# Patient Record
Sex: MALE | Race: WHITE | NOT HISPANIC OR LATINO | Employment: OTHER | URBAN - METROPOLITAN AREA
[De-identification: names, ages, dates, MRNs, and addresses within clinical notes are randomized per-mention and may not be internally consistent; named-entity substitution may affect disease eponyms.]

---

## 2019-07-12 ENCOUNTER — APPOINTMENT (OUTPATIENT)
Dept: GENERAL RADIOLOGY | Facility: HOSPITAL | Age: 69
End: 2019-07-12

## 2019-07-12 ENCOUNTER — HOSPITAL ENCOUNTER (OUTPATIENT)
Facility: HOSPITAL | Age: 69
Setting detail: OBSERVATION
Discharge: HOME | End: 2019-07-14
Attending: EMERGENCY MEDICINE | Admitting: HOSPITALIST

## 2019-07-12 ENCOUNTER — HOSPITAL ENCOUNTER (EMERGENCY)
Facility: HOSPITAL | Age: 69
Discharge: ANOTHER HOSPITAL | End: 2019-07-12
Attending: EMERGENCY MEDICINE | Admitting: EMERGENCY MEDICINE

## 2019-07-12 VITALS
DIASTOLIC BLOOD PRESSURE: 98 MMHG | TEMPERATURE: 98.2 F | BODY MASS INDEX: 28.63 KG/M2 | HEIGHT: 70 IN | WEIGHT: 200 LBS | RESPIRATION RATE: 18 BRPM | HEART RATE: 75 BPM | SYSTOLIC BLOOD PRESSURE: 130 MMHG | OXYGEN SATURATION: 98 %

## 2019-07-12 DIAGNOSIS — T18.108A FOREIGN BODY IN ESOPHAGUS, INITIAL ENCOUNTER: Primary | ICD-10-CM

## 2019-07-12 DIAGNOSIS — R13.10 DYSPHAGIA, UNSPECIFIED TYPE: ICD-10-CM

## 2019-07-12 DIAGNOSIS — T18.128A FOOD IMPACTION OF ESOPHAGUS, INITIAL ENCOUNTER: Primary | ICD-10-CM

## 2019-07-12 LAB — TROPONIN T SERPL-MCNC: <0.01 NG/ML (ref 0–0.03)

## 2019-07-12 PROCEDURE — 85025 COMPLETE CBC W/AUTO DIFF WBC: CPT | Performed by: EMERGENCY MEDICINE

## 2019-07-12 PROCEDURE — 25010000002 GLUCAGON (RDNA) PER 1 MG: Performed by: EMERGENCY MEDICINE

## 2019-07-12 PROCEDURE — 96374 THER/PROPH/DIAG INJ IV PUSH: CPT

## 2019-07-12 PROCEDURE — 99283 EMERGENCY DEPT VISIT LOW MDM: CPT

## 2019-07-12 PROCEDURE — 93010 ELECTROCARDIOGRAM REPORT: CPT | Performed by: INTERNAL MEDICINE

## 2019-07-12 PROCEDURE — 96361 HYDRATE IV INFUSION ADD-ON: CPT

## 2019-07-12 PROCEDURE — G0378 HOSPITAL OBSERVATION PER HR: HCPCS

## 2019-07-12 PROCEDURE — 99284 EMERGENCY DEPT VISIT MOD MDM: CPT

## 2019-07-12 PROCEDURE — 99284 EMERGENCY DEPT VISIT MOD MDM: CPT | Performed by: EMERGENCY MEDICINE

## 2019-07-12 PROCEDURE — 93005 ELECTROCARDIOGRAM TRACING: CPT | Performed by: EMERGENCY MEDICINE

## 2019-07-12 PROCEDURE — 71046 X-RAY EXAM CHEST 2 VIEWS: CPT

## 2019-07-12 PROCEDURE — 84484 ASSAY OF TROPONIN QUANT: CPT | Performed by: EMERGENCY MEDICINE

## 2019-07-12 RX ORDER — OMEPRAZOLE 20 MG/1
20 CAPSULE, DELAYED RELEASE ORAL DAILY
COMMUNITY
End: 2019-07-31

## 2019-07-12 RX ORDER — SODIUM CHLORIDE 0.9 % (FLUSH) 0.9 %
10 SYRINGE (ML) INJECTION AS NEEDED
Status: DISCONTINUED | OUTPATIENT
Start: 2019-07-12 | End: 2019-07-14 | Stop reason: HOSPADM

## 2019-07-12 RX ORDER — SODIUM CHLORIDE 9 MG/ML
125 INJECTION, SOLUTION INTRAVENOUS CONTINUOUS
Status: DISCONTINUED | OUTPATIENT
Start: 2019-07-12 | End: 2019-07-13

## 2019-07-12 RX ORDER — LOSARTAN POTASSIUM 100 MG/1
100 TABLET ORAL DAILY
COMMUNITY

## 2019-07-12 RX ADMIN — SODIUM CHLORIDE 1000 ML: 9 INJECTION, SOLUTION INTRAVENOUS at 21:32

## 2019-07-12 RX ADMIN — SODIUM CHLORIDE 125 ML/HR: 9 INJECTION, SOLUTION INTRAVENOUS at 23:38

## 2019-07-12 RX ADMIN — GLUCAGON HYDROCHLORIDE 1 MG: KIT at 20:53

## 2019-07-13 ENCOUNTER — ANESTHESIA EVENT (OUTPATIENT)
Dept: PERIOP | Facility: HOSPITAL | Age: 69
End: 2019-07-13

## 2019-07-13 ENCOUNTER — ANESTHESIA (OUTPATIENT)
Dept: PERIOP | Facility: HOSPITAL | Age: 69
End: 2019-07-13

## 2019-07-13 ENCOUNTER — APPOINTMENT (OUTPATIENT)
Dept: GENERAL RADIOLOGY | Facility: HOSPITAL | Age: 69
End: 2019-07-13

## 2019-07-13 PROBLEM — Z98.890 POSTOPERATIVE HYPOXIA: Status: ACTIVE | Noted: 2019-07-13

## 2019-07-13 PROBLEM — R09.02 POSTOPERATIVE HYPOXIA: Status: ACTIVE | Noted: 2019-07-13

## 2019-07-13 PROBLEM — T17.908A ASPIRATION INTO AIRWAY: Status: ACTIVE | Noted: 2019-07-13

## 2019-07-13 PROBLEM — T18.108A FOREIGN BODY IN ESOPHAGUS: Status: ACTIVE | Noted: 2019-07-13

## 2019-07-13 PROBLEM — R13.10 DYSPHAGIA: Status: ACTIVE | Noted: 2019-07-13

## 2019-07-13 LAB
ALBUMIN SERPL-MCNC: 4.3 G/DL (ref 3.5–5.2)
ALBUMIN/GLOB SERPL: 1.4 G/DL
ALP SERPL-CCNC: 62 U/L (ref 39–117)
ALT SERPL W P-5'-P-CCNC: 31 U/L (ref 1–41)
ANION GAP SERPL CALCULATED.3IONS-SCNC: 10.5 MMOL/L (ref 5–15)
AST SERPL-CCNC: 32 U/L (ref 1–40)
BASOPHILS # BLD AUTO: 0.01 10*3/MM3 (ref 0–0.2)
BASOPHILS # BLD AUTO: 0.04 10*3/MM3 (ref 0–0.2)
BASOPHILS NFR BLD AUTO: 0.1 % (ref 0–1.5)
BASOPHILS NFR BLD AUTO: 0.3 % (ref 0–1.5)
BILIRUB SERPL-MCNC: 2.7 MG/DL (ref 0.2–1.2)
BUN BLD-MCNC: 15 MG/DL (ref 8–23)
BUN/CREAT SERPL: 14.9 (ref 7–25)
CALCIUM SPEC-SCNC: 8.8 MG/DL (ref 8.6–10.5)
CHLORIDE SERPL-SCNC: 102 MMOL/L (ref 98–107)
CO2 SERPL-SCNC: 24.5 MMOL/L (ref 22–29)
CREAT BLD-MCNC: 1.01 MG/DL (ref 0.76–1.27)
DEPRECATED RDW RBC AUTO: 43.2 FL (ref 37–54)
DEPRECATED RDW RBC AUTO: 43.3 FL (ref 37–54)
EOSINOPHIL # BLD AUTO: 0 10*3/MM3 (ref 0–0.4)
EOSINOPHIL # BLD AUTO: 0.01 10*3/MM3 (ref 0–0.4)
EOSINOPHIL NFR BLD AUTO: 0 % (ref 0.3–6.2)
EOSINOPHIL NFR BLD AUTO: 0.1 % (ref 0.3–6.2)
ERYTHROCYTE [DISTWIDTH] IN BLOOD BY AUTOMATED COUNT: 12.6 % (ref 12.3–15.4)
ERYTHROCYTE [DISTWIDTH] IN BLOOD BY AUTOMATED COUNT: 12.6 % (ref 12.3–15.4)
GFR SERPL CREATININE-BSD FRML MDRD: 74 ML/MIN/1.73
GLOBULIN UR ELPH-MCNC: 3 GM/DL
GLUCOSE BLD-MCNC: 128 MG/DL (ref 65–99)
HCT VFR BLD AUTO: 51.8 % (ref 37.5–51)
HCT VFR BLD AUTO: 52 % (ref 37.5–51)
HGB BLD-MCNC: 17.1 G/DL (ref 13–17.7)
HGB BLD-MCNC: 17.5 G/DL (ref 13–17.7)
IMM GRANULOCYTES # BLD AUTO: 0.01 10*3/MM3 (ref 0–0.05)
IMM GRANULOCYTES # BLD AUTO: 0.07 10*3/MM3 (ref 0–0.05)
IMM GRANULOCYTES NFR BLD AUTO: 0.1 % (ref 0–0.5)
IMM GRANULOCYTES NFR BLD AUTO: 0.5 % (ref 0–0.5)
LYMPHOCYTES # BLD AUTO: 0.58 10*3/MM3 (ref 0.7–3.1)
LYMPHOCYTES # BLD AUTO: 1.12 10*3/MM3 (ref 0.7–3.1)
LYMPHOCYTES NFR BLD AUTO: 7.2 % (ref 19.6–45.3)
LYMPHOCYTES NFR BLD AUTO: 7.7 % (ref 19.6–45.3)
MCH RBC QN AUTO: 31.2 PG (ref 26.6–33)
MCH RBC QN AUTO: 31.3 PG (ref 26.6–33)
MCHC RBC AUTO-ENTMCNC: 33 G/DL (ref 31.5–35.7)
MCHC RBC AUTO-ENTMCNC: 33.7 G/DL (ref 31.5–35.7)
MCV RBC AUTO: 93 FL (ref 79–97)
MCV RBC AUTO: 94.5 FL (ref 79–97)
MONOCYTES # BLD AUTO: 0.54 10*3/MM3 (ref 0.1–0.9)
MONOCYTES # BLD AUTO: 0.93 10*3/MM3 (ref 0.1–0.9)
MONOCYTES NFR BLD AUTO: 6.4 % (ref 5–12)
MONOCYTES NFR BLD AUTO: 6.7 % (ref 5–12)
NEUTROPHILS # BLD AUTO: 12.42 10*3/MM3 (ref 1.7–7)
NEUTROPHILS # BLD AUTO: 6.95 10*3/MM3 (ref 1.7–7)
NEUTROPHILS NFR BLD AUTO: 85 % (ref 42.7–76)
NEUTROPHILS NFR BLD AUTO: 85.9 % (ref 42.7–76)
NRBC BLD AUTO-RTO: 0 /100 WBC (ref 0–0.2)
NRBC BLD AUTO-RTO: 0 /100 WBC (ref 0–0.2)
PLATELET # BLD AUTO: 188 10*3/MM3 (ref 140–450)
PLATELET # BLD AUTO: 196 10*3/MM3 (ref 140–450)
PMV BLD AUTO: 9.8 FL (ref 6–12)
PMV BLD AUTO: 9.8 FL (ref 6–12)
POTASSIUM BLD-SCNC: 4.4 MMOL/L (ref 3.5–5.2)
PROT SERPL-MCNC: 7.3 G/DL (ref 6–8.5)
RBC # BLD AUTO: 5.48 10*6/MM3 (ref 4.14–5.8)
RBC # BLD AUTO: 5.59 10*6/MM3 (ref 4.14–5.8)
SODIUM BLD-SCNC: 137 MMOL/L (ref 136–145)
TROPONIN T SERPL-MCNC: <0.01 NG/ML (ref 0–0.03)
WBC NRBC COR # BLD: 14.59 10*3/MM3 (ref 3.4–10.8)
WBC NRBC COR # BLD: 8.09 10*3/MM3 (ref 3.4–10.8)

## 2019-07-13 PROCEDURE — G0378 HOSPITAL OBSERVATION PER HR: HCPCS

## 2019-07-13 PROCEDURE — 25010000002 ONDANSETRON PER 1 MG: Performed by: ANESTHESIOLOGY

## 2019-07-13 PROCEDURE — 25010000002 PIPERACILLIN SOD-TAZOBACTAM PER 1 G: Performed by: INTERNAL MEDICINE

## 2019-07-13 PROCEDURE — 99253 IP/OBS CNSLTJ NEW/EST LOW 45: CPT | Performed by: INTERNAL MEDICINE

## 2019-07-13 PROCEDURE — 85025 COMPLETE CBC W/AUTO DIFF WBC: CPT | Performed by: INTERNAL MEDICINE

## 2019-07-13 PROCEDURE — 25010000002 PROPOFOL 10 MG/ML EMULSION: Performed by: ANESTHESIOLOGY

## 2019-07-13 PROCEDURE — 93005 ELECTROCARDIOGRAM TRACING: CPT | Performed by: INTERNAL MEDICINE

## 2019-07-13 PROCEDURE — 71046 X-RAY EXAM CHEST 2 VIEWS: CPT

## 2019-07-13 PROCEDURE — 43239 EGD BIOPSY SINGLE/MULTIPLE: CPT | Performed by: INTERNAL MEDICINE

## 2019-07-13 PROCEDURE — 25010000002 SUCCINYLCHOLINE PER 20 MG: Performed by: ANESTHESIOLOGY

## 2019-07-13 PROCEDURE — 80053 COMPREHEN METABOLIC PANEL: CPT | Performed by: INTERNAL MEDICINE

## 2019-07-13 PROCEDURE — 25010000002 DEXAMETHASONE PER 1 MG: Performed by: ANESTHESIOLOGY

## 2019-07-13 PROCEDURE — 88305 TISSUE EXAM BY PATHOLOGIST: CPT | Performed by: INTERNAL MEDICINE

## 2019-07-13 PROCEDURE — 84484 ASSAY OF TROPONIN QUANT: CPT | Performed by: INTERNAL MEDICINE

## 2019-07-13 PROCEDURE — 43249 ESOPH EGD DILATION <30 MM: CPT | Performed by: INTERNAL MEDICINE

## 2019-07-13 PROCEDURE — 99284 EMERGENCY DEPT VISIT MOD MDM: CPT | Performed by: INTERNAL MEDICINE

## 2019-07-13 PROCEDURE — 99024 POSTOP FOLLOW-UP VISIT: CPT | Performed by: INTERNAL MEDICINE

## 2019-07-13 PROCEDURE — 93010 ELECTROCARDIOGRAM REPORT: CPT | Performed by: INTERNAL MEDICINE

## 2019-07-13 PROCEDURE — C1726 CATH, BAL DIL, NON-VASCULAR: HCPCS | Performed by: INTERNAL MEDICINE

## 2019-07-13 RX ORDER — LOSARTAN POTASSIUM 100 MG/1
100 TABLET ORAL DAILY
Status: DISCONTINUED | OUTPATIENT
Start: 2019-07-13 | End: 2019-07-14 | Stop reason: HOSPADM

## 2019-07-13 RX ORDER — LIDOCAINE HYDROCHLORIDE 10 MG/ML
0.5 INJECTION, SOLUTION EPIDURAL; INFILTRATION; INTRACAUDAL; PERINEURAL ONCE AS NEEDED
Status: DISCONTINUED | OUTPATIENT
Start: 2019-07-13 | End: 2019-07-13 | Stop reason: HOSPADM

## 2019-07-13 RX ORDER — SODIUM CHLORIDE 0.9 % (FLUSH) 0.9 %
3-10 SYRINGE (ML) INJECTION AS NEEDED
Status: DISCONTINUED | OUTPATIENT
Start: 2019-07-13 | End: 2019-07-14 | Stop reason: HOSPADM

## 2019-07-13 RX ORDER — HYDROMORPHONE HYDROCHLORIDE 1 MG/ML
0.5 INJECTION, SOLUTION INTRAMUSCULAR; INTRAVENOUS; SUBCUTANEOUS
Status: DISCONTINUED | OUTPATIENT
Start: 2019-07-13 | End: 2019-07-13 | Stop reason: HOSPADM

## 2019-07-13 RX ORDER — FENTANYL CITRATE 50 UG/ML
50 INJECTION, SOLUTION INTRAMUSCULAR; INTRAVENOUS
Status: DISCONTINUED | OUTPATIENT
Start: 2019-07-13 | End: 2019-07-13 | Stop reason: HOSPADM

## 2019-07-13 RX ORDER — PROPOFOL 10 MG/ML
VIAL (ML) INTRAVENOUS AS NEEDED
Status: DISCONTINUED | OUTPATIENT
Start: 2019-07-13 | End: 2019-07-13 | Stop reason: SURG

## 2019-07-13 RX ORDER — FLUMAZENIL 0.1 MG/ML
0.2 INJECTION INTRAVENOUS AS NEEDED
Status: DISCONTINUED | OUTPATIENT
Start: 2019-07-13 | End: 2019-07-13 | Stop reason: HOSPADM

## 2019-07-13 RX ORDER — ONDANSETRON 2 MG/ML
INJECTION INTRAMUSCULAR; INTRAVENOUS AS NEEDED
Status: DISCONTINUED | OUTPATIENT
Start: 2019-07-13 | End: 2019-07-13 | Stop reason: SURG

## 2019-07-13 RX ORDER — PANTOPRAZOLE SODIUM 40 MG/1
40 TABLET, DELAYED RELEASE ORAL EVERY MORNING
Status: DISCONTINUED | OUTPATIENT
Start: 2019-07-13 | End: 2019-07-14 | Stop reason: HOSPADM

## 2019-07-13 RX ORDER — FAMOTIDINE 10 MG/ML
20 INJECTION, SOLUTION INTRAVENOUS ONCE
Status: COMPLETED | OUTPATIENT
Start: 2019-07-13 | End: 2019-07-13

## 2019-07-13 RX ORDER — LIDOCAINE HYDROCHLORIDE 20 MG/ML
INJECTION, SOLUTION INFILTRATION; PERINEURAL AS NEEDED
Status: DISCONTINUED | OUTPATIENT
Start: 2019-07-13 | End: 2019-07-13 | Stop reason: SURG

## 2019-07-13 RX ORDER — HYDROCODONE BITARTRATE AND ACETAMINOPHEN 7.5; 325 MG/1; MG/1
1 TABLET ORAL ONCE AS NEEDED
Status: DISCONTINUED | OUTPATIENT
Start: 2019-07-13 | End: 2019-07-13 | Stop reason: HOSPADM

## 2019-07-13 RX ORDER — ONDANSETRON 2 MG/ML
4 INJECTION INTRAMUSCULAR; INTRAVENOUS ONCE AS NEEDED
Status: DISCONTINUED | OUTPATIENT
Start: 2019-07-13 | End: 2019-07-13 | Stop reason: HOSPADM

## 2019-07-13 RX ORDER — LABETALOL HYDROCHLORIDE 5 MG/ML
5 INJECTION, SOLUTION INTRAVENOUS
Status: DISCONTINUED | OUTPATIENT
Start: 2019-07-13 | End: 2019-07-13 | Stop reason: HOSPADM

## 2019-07-13 RX ORDER — HYDRALAZINE HYDROCHLORIDE 20 MG/ML
5 INJECTION INTRAMUSCULAR; INTRAVENOUS
Status: DISCONTINUED | OUTPATIENT
Start: 2019-07-13 | End: 2019-07-13 | Stop reason: HOSPADM

## 2019-07-13 RX ORDER — FAMOTIDINE 10 MG/ML
INJECTION, SOLUTION INTRAVENOUS
Status: COMPLETED
Start: 2019-07-13 | End: 2019-07-13

## 2019-07-13 RX ORDER — ACETAMINOPHEN 325 MG/1
650 TABLET ORAL ONCE AS NEEDED
Status: DISCONTINUED | OUTPATIENT
Start: 2019-07-13 | End: 2019-07-13 | Stop reason: HOSPADM

## 2019-07-13 RX ORDER — NALOXONE HCL 0.4 MG/ML
0.2 VIAL (ML) INJECTION AS NEEDED
Status: DISCONTINUED | OUTPATIENT
Start: 2019-07-13 | End: 2019-07-13 | Stop reason: HOSPADM

## 2019-07-13 RX ORDER — DIPHENHYDRAMINE HCL 25 MG
25 CAPSULE ORAL
Status: DISCONTINUED | OUTPATIENT
Start: 2019-07-13 | End: 2019-07-13 | Stop reason: HOSPADM

## 2019-07-13 RX ORDER — DIPHENHYDRAMINE HYDROCHLORIDE 50 MG/ML
12.5 INJECTION INTRAMUSCULAR; INTRAVENOUS
Status: DISCONTINUED | OUTPATIENT
Start: 2019-07-13 | End: 2019-07-13 | Stop reason: HOSPADM

## 2019-07-13 RX ORDER — SODIUM CHLORIDE 0.9 % (FLUSH) 0.9 %
3 SYRINGE (ML) INJECTION EVERY 12 HOURS SCHEDULED
Status: DISCONTINUED | OUTPATIENT
Start: 2019-07-13 | End: 2019-07-14 | Stop reason: HOSPADM

## 2019-07-13 RX ORDER — NITROGLYCERIN 0.4 MG/1
0.4 TABLET SUBLINGUAL
Status: DISCONTINUED | OUTPATIENT
Start: 2019-07-13 | End: 2019-07-14 | Stop reason: HOSPADM

## 2019-07-13 RX ORDER — SODIUM CHLORIDE 0.9 % (FLUSH) 0.9 %
1-10 SYRINGE (ML) INJECTION AS NEEDED
Status: DISCONTINUED | OUTPATIENT
Start: 2019-07-13 | End: 2019-07-13 | Stop reason: HOSPADM

## 2019-07-13 RX ORDER — DEXAMETHASONE SODIUM PHOSPHATE 4 MG/ML
INJECTION, SOLUTION INTRA-ARTICULAR; INTRALESIONAL; INTRAMUSCULAR; INTRAVENOUS; SOFT TISSUE AS NEEDED
Status: DISCONTINUED | OUTPATIENT
Start: 2019-07-13 | End: 2019-07-13 | Stop reason: SURG

## 2019-07-13 RX ORDER — SUCCINYLCHOLINE CHLORIDE 20 MG/ML
INJECTION INTRAMUSCULAR; INTRAVENOUS AS NEEDED
Status: DISCONTINUED | OUTPATIENT
Start: 2019-07-13 | End: 2019-07-13 | Stop reason: SURG

## 2019-07-13 RX ORDER — EPHEDRINE SULFATE 50 MG/ML
5 INJECTION, SOLUTION INTRAVENOUS ONCE AS NEEDED
Status: DISCONTINUED | OUTPATIENT
Start: 2019-07-13 | End: 2019-07-13 | Stop reason: HOSPADM

## 2019-07-13 RX ORDER — PROMETHAZINE HYDROCHLORIDE 25 MG/ML
12.5 INJECTION, SOLUTION INTRAMUSCULAR; INTRAVENOUS ONCE AS NEEDED
Status: DISCONTINUED | OUTPATIENT
Start: 2019-07-13 | End: 2019-07-13 | Stop reason: HOSPADM

## 2019-07-13 RX ORDER — OXYCODONE AND ACETAMINOPHEN 7.5; 325 MG/1; MG/1
1 TABLET ORAL ONCE AS NEEDED
Status: DISCONTINUED | OUTPATIENT
Start: 2019-07-13 | End: 2019-07-13 | Stop reason: HOSPADM

## 2019-07-13 RX ORDER — ACETAMINOPHEN 325 MG/1
650 TABLET ORAL EVERY 4 HOURS PRN
Status: DISCONTINUED | OUTPATIENT
Start: 2019-07-13 | End: 2019-07-14 | Stop reason: HOSPADM

## 2019-07-13 RX ORDER — PROMETHAZINE HYDROCHLORIDE 25 MG/1
25 TABLET ORAL ONCE AS NEEDED
Status: DISCONTINUED | OUTPATIENT
Start: 2019-07-13 | End: 2019-07-13 | Stop reason: HOSPADM

## 2019-07-13 RX ORDER — SODIUM CHLORIDE, SODIUM LACTATE, POTASSIUM CHLORIDE, CALCIUM CHLORIDE 600; 310; 30; 20 MG/100ML; MG/100ML; MG/100ML; MG/100ML
9 INJECTION, SOLUTION INTRAVENOUS CONTINUOUS
Status: DISCONTINUED | OUTPATIENT
Start: 2019-07-13 | End: 2019-07-13

## 2019-07-13 RX ORDER — PROMETHAZINE HYDROCHLORIDE 25 MG/1
25 SUPPOSITORY RECTAL ONCE AS NEEDED
Status: DISCONTINUED | OUTPATIENT
Start: 2019-07-13 | End: 2019-07-13 | Stop reason: HOSPADM

## 2019-07-13 RX ORDER — PROMETHAZINE HYDROCHLORIDE 25 MG/ML
6.25 INJECTION, SOLUTION INTRAMUSCULAR; INTRAVENOUS
Status: DISCONTINUED | OUTPATIENT
Start: 2019-07-13 | End: 2019-07-13 | Stop reason: HOSPADM

## 2019-07-13 RX ADMIN — SUCCINYLCHOLINE CHLORIDE 160 MG: 20 INJECTION, SOLUTION INTRAMUSCULAR; INTRAVENOUS; PARENTERAL at 01:45

## 2019-07-13 RX ADMIN — TAZOBACTAM SODIUM AND PIPERACILLIN SODIUM 3.38 G: 375; 3 INJECTION, SOLUTION INTRAVENOUS at 06:29

## 2019-07-13 RX ADMIN — PROPOFOL 50 MG: 10 INJECTION, EMULSION INTRAVENOUS at 01:57

## 2019-07-13 RX ADMIN — LOSARTAN POTASSIUM 100 MG: 100 TABLET, FILM COATED ORAL at 09:27

## 2019-07-13 RX ADMIN — LIDOCAINE HYDROCHLORIDE 50 MG: 20 INJECTION, SOLUTION INFILTRATION; PERINEURAL at 01:45

## 2019-07-13 RX ADMIN — SODIUM CHLORIDE 125 ML/HR: 9 INJECTION, SOLUTION INTRAVENOUS at 06:30

## 2019-07-13 RX ADMIN — TAZOBACTAM SODIUM AND PIPERACILLIN SODIUM 3.38 G: 375; 3 INJECTION, SOLUTION INTRAVENOUS at 13:03

## 2019-07-13 RX ADMIN — DEXAMETHASONE SODIUM PHOSPHATE 4 MG: 4 INJECTION INTRA-ARTICULAR; INTRALESIONAL; INTRAMUSCULAR; INTRAVENOUS; SOFT TISSUE at 02:07

## 2019-07-13 RX ADMIN — FAMOTIDINE 20 MG: 10 INJECTION, SOLUTION INTRAVENOUS at 01:26

## 2019-07-13 RX ADMIN — PROPOFOL 200 MG: 10 INJECTION, EMULSION INTRAVENOUS at 01:39

## 2019-07-13 RX ADMIN — PROPOFOL 50 MG: 10 INJECTION, EMULSION INTRAVENOUS at 01:45

## 2019-07-13 RX ADMIN — Medication 3 ML: at 20:03

## 2019-07-13 RX ADMIN — PANTOPRAZOLE SODIUM 40 MG: 40 TABLET, DELAYED RELEASE ORAL at 09:27

## 2019-07-13 RX ADMIN — FAMOTIDINE 20 MG: 10 INJECTION INTRAVENOUS at 01:26

## 2019-07-13 RX ADMIN — ONDANSETRON 4 MG: 2 INJECTION INTRAMUSCULAR; INTRAVENOUS at 02:07

## 2019-07-13 NOTE — PROGRESS NOTES
[facility]  Inpatient Progress Note    Reason for Follow Up:  Dysphagia with an esophageal foreign body.    Subjective     Interval History:   No c/o. No SOA or coughing.     Current Facility-Administered Medications:   •  acetaminophen (TYLENOL) tablet 650 mg, 650 mg, Oral, Q4H PRN, Yariel Steven MD  •  lactated ringers infusion, 9 mL/hr, Intravenous, Continuous, Stephanie Weber MD  •  losartan (COZAAR) tablet 100 mg, 100 mg, Oral, Daily, Yariel Steven MD  •  nitroglycerin (NITROSTAT) SL tablet 0.4 mg, 0.4 mg, Sublingual, Q5 Min PRN, Yariel Steven MD  •  pantoprazole (PROTONIX) EC tablet 40 mg, 40 mg, Oral, QAM, Yariel Steven MD  •  Pharmacy to Dose Zosyn, , Does not apply, Continuous PRN, Glenn Carias MD  •  [COMPLETED] Insert peripheral IV, , , Once **AND** sodium chloride 0.9 % flush 10 mL, 10 mL, Intravenous, PRN, Lakhwinder Wright MD  •  sodium chloride 0.9 % flush 3 mL, 3 mL, Intravenous, Q12H, Yariel Steven MD  •  sodium chloride 0.9 % flush 3-10 mL, 3-10 mL, Intravenous, PRN, Yariel Steven MD  •  sodium chloride 0.9 % infusion, 125 mL/hr, Intravenous, Continuous, Lakhwinder Wright MD, Last Rate: 125 mL/hr at 07/13/19 0630, 125 mL/hr at 07/13/19 0630  Review of Systems:    The following systems were reviewed and negative;  cardiovascular, musculoskeletal and neurological    Objective     Vital Signs  Temp:  [98.2 °F (36.8 °C)-98.6 °F (37 °C)] 98.6 °F (37 °C)  Heart Rate:  [] 111  Resp:  [16-20] 18  BP: ()/(53-98) 110/74  Body mass index is 28.47 kg/m².    Intake/Output Summary (Last 24 hours) at 7/13/2019 0813  Last data filed at 7/13/2019 0630  Gross per 24 hour   Intake 1050 ml   Output 400 ml   Net 650 ml     No intake/output data recorded.     Physical Exam:   General: patient awake, alert and cooperative   Eyes: Normal lids and lashes, no scleral icterus   Neck: supple, normal ROM   Skin: warm and dry, not jaundiced   Cardiovascular: regular rhythm and rate, no murmurs  auscultated   Pulm: clear to auscultation bilaterally, regular and unlabored   Abdomen: soft, nontender, nondistended; normal bowel sounds   Rectal: deferred   Extremities: no rash or edema   Psychiatric: Normal mood and behavior; memory intact     Results Review:     I reviewed the patient's new clinical results.    Results from last 7 days   Lab Units 07/13/19  0644 07/12/19  2330   WBC 10*3/mm3 8.09 14.59*   HEMOGLOBIN g/dL 17.5 17.1   HEMATOCRIT % 52.0* 51.8*   PLATELETS 10*3/mm3 188 196     Results from last 7 days   Lab Units 07/13/19  0644   SODIUM mmol/L 137   POTASSIUM mmol/L 4.4   CHLORIDE mmol/L 102   CO2 mmol/L 24.5   BUN mg/dL 15   CREATININE mg/dL 1.01   CALCIUM mg/dL 8.8   BILIRUBIN mg/dL 2.7*   ALK PHOS U/L 62   ALT (SGPT) U/L 31   AST (SGOT) U/L 32   GLUCOSE mg/dL 128*         No results found for: LIPASE    Radiology:  No orders to display       Assessment/Plan     Patient Active Problem List   Diagnosis   • Foreign body in esophagus   • Dysphagia   • Hypertension   • Postoperative hypoxia   • Aspiration into airway       I discussed the patients findings and my recommendations with patient.    Assessment/Recommendations:  1) Esophageal foreign body with EGD last night and extraction of foreign body with dilation of distal esophageal stricture. On Protonix 40 mg/day.  2) Decreased O2 sat after patient was extubated after procedure. Did he aspirate? I will recheck CXR. On Zosyn. Advance diet.    Glenn Carias MD

## 2019-07-13 NOTE — ANESTHESIA PREPROCEDURE EVALUATION
Anesthesia Evaluation     Patient summary reviewed and Nursing notes reviewed                Airway   Mallampati: II  Dental - normal exam     Pulmonary - negative pulmonary ROS and normal exam   Cardiovascular - normal exam    (+) hypertension,       Neuro/Psych- negative ROS  GI/Hepatic/Renal/Endo    (+)  GERD,      Musculoskeletal (-) negative ROS    Abdominal    Substance History - negative use     OB/GYN          Other - negative ROS                     Anesthesia Plan    ASA 2 - emergent     general

## 2019-07-13 NOTE — ANESTHESIA PROCEDURE NOTES
Airway  Urgency: elective    Airway not difficult    General Information and Staff    Patient location during procedure: OR  Anesthesiologist: Stephanie Weber MD    Indications and Patient Condition  Indications for airway management: airway protection    Preoxygenated: yes  MILS maintained throughout  Mask difficulty assessment: 1 - vent by mask    Final Airway Details  Final airway type: endotracheal airway      Successful airway: ETT  Cuffed: yes   Successful intubation technique: direct laryngoscopy  Facilitating devices/methods: intubating stylet, anterior pressure/BURP and Bougie  Endotracheal tube insertion site: oral  Blade: Franklin  Blade size: 4  ETT size (mm): 7.5  Cormack-Lehane Classification: grade III - view of epiglottis only  Placement verified by: chest auscultation and capnometry   Measured from: lips  Number of attempts at approach: 3 or more

## 2019-07-13 NOTE — H&P
Internal medicine history and physical  INTERNAL MEDICINE   [facility]       Patient Identification:  Name: Fabio Zhang  Age: 68 y.o.  Sex: male  :  1950  MRN: [id number]                   Primary Care Physician: Provider, No Known                                   Chief Complaint: Requested by anesthesiology service after disimpaction of foreign body that patient may require overnight admission to monitor for increased oxygen requirement following intervention.    History of Present Illness:   Patient is a 68-year-old male with history of gastroesophageal reflux disease, history of dysphagia and Schatzki's ring for which he had undergone EGD and dilatation 6 or 7 years ago, history of high blood pressure was otherwise in his usual state of his health until 6 or 6:30 this evening while eating dinner had a steak stuck in his esophagus and he could not swallow afterwards.  After struggling for 30 minutes he was taken to the emergency room at [facility] where initial work-up revealed impacted foreign body/steak in the esophagus requiring surgical or endoscopic intervention to relieve this obstruction.  As a part of the work-up he also had a EKG that revealed left ventricular hypertrophy and right bundle branch block pattern.  Patient did not have any other symptoms of preceding chest pain dyspnea on exertion prior to the impaction of steak.  Patient was accepted in transfer by GI service for emergent endoscopic disimpaction of the foreign body.  It was performed under general anesthesia with intubation.  Post procedure patient required increasing oxygen support resulting in decision making by anesthesiology service for overnight admission and observation.  Hospitalist service was consulted.  Patient is currently feeling better denies any shortness of breath of feel hampered.  Patient's wife was asking about her to follow-up on abnormal EKG and would feel comfortable if he has  cardiology to look at his EKG.  Patient denies any fever chills or sense of ill health at this time.      Past Medical History:  Past Medical History:   Diagnosis Date   • GERD (gastroesophageal reflux disease)    • Hypertension      Past Surgical History:  Past Surgical History:   Procedure Laterality Date   • ESOPHAGEAL DILATATION     • HERNIA REPAIR        Home Meds:  Medications Prior to Admission   Medication Sig Dispense Refill Last Dose   • losartan (COZAAR) 100 MG tablet Take 100 mg by mouth Daily.   7/12/2019 at Unknown time   • omeprazole (priLOSEC) 20 MG capsule Take 20 mg by mouth Daily.   7/12/2019 at Unknown time     Current Meds:     Current Facility-Administered Medications:   •  acetaminophen (TYLENOL) tablet 650 mg, 650 mg, Oral, Once PRN **OR** acetaminophen (TYLENOL) suppository 650 mg, 650 mg, Rectal, Once PRN, Stephanie Weber MD  •  diphenhydrAMINE (BENADRYL) capsule 25 mg, 25 mg, Oral, Q30 Min PRN, Stephanie Weber MD  •  diphenhydrAMINE (BENADRYL) injection 12.5 mg, 12.5 mg, Intravenous, Q15 Min PRN, Stephanie Weber MD  •  ePHEDrine injection 5 mg, 5 mg, Intravenous, Once PRN, Stephanie Weber MD  •  fentaNYL citrate (PF) (SUBLIMAZE) injection 50 mcg, 50 mcg, Intravenous, Q5 Min PRN, Stephanie Weber MD  •  flumazenil (ROMAZICON) injection 0.2 mg, 0.2 mg, Intravenous, PRN, Stephanie Weber MD  •  hydrALAZINE (APRESOLINE) injection 5 mg, 5 mg, Intravenous, Q10 Min PRN, Stephanie Weber MD  •  HYDROcodone-acetaminophen (NORCO) 7.5-325 MG per tablet 1 tablet, 1 tablet, Oral, Once PRN, Stephanie Weber MD  •  HYDROmorphone (DILAUDID) injection 0.5 mg, 0.5 mg, Intravenous, Q5 Min PRNTia Cheryl G, MD  •  labetalol (NORMODYNE,TRANDATE) injection 5 mg, 5 mg, Intravenous, Q5 Min PRN, Stephanie Weber MD  •  lactated ringers infusion, 9 mL/hr, Intravenous, Continuous, Stephanie Weber MD  •  naloxone (NARCAN) injection 0.2 mg, 0.2 mg, Intravenous, PRN, Stephanie Weber MD  •   ondansetron (ZOFRAN) injection 4 mg, 4 mg, Intravenous, Once PRN, Stephanie Weber MD  •  oxyCODONE-acetaminophen (PERCOCET) 7.5-325 MG per tablet 1 tablet, 1 tablet, Oral, Once PRN, Stephanie Weber MD  •  piperacillin-tazobactam (ZOSYN) 3.375 g in iso-osmotic dextrose 50 ml (premix), 3.375 g, Intravenous, BID, Glenn Carias MD  •  promethazine (PHENERGAN) injection 6.25 mg, 6.25 mg, Intravenous, Q10 Min PRN **OR** promethazine (PHENERGAN) injection 12.5 mg, 12.5 mg, Intramuscular, Once PRN **OR** promethazine (PHENERGAN) suppository 25 mg, 25 mg, Rectal, Once PRN **OR** promethazine (PHENERGAN) tablet 25 mg, 25 mg, Oral, Once PRN, Stephanie Weber MD  •  [COMPLETED] Insert peripheral IV, , , Once **AND** [MAR Hold] sodium chloride 0.9 % flush 10 mL, 10 mL, Intravenous, PRN, Lakhwinder Wright MD  •  sodium chloride 0.9 % infusion, 125 mL/hr, Intravenous, Continuous, Lakhwinder Wright MD, Last Rate: 125 mL/hr at 07/12/19 2338, 125 mL/hr at 07/12/19 2338  Allergies:  No Known Allergies  Social History:   Social History     Tobacco Use   • Smoking status: Never Smoker   Substance Use Topics   • Alcohol use: No     Frequency: Never      Family History:  History reviewed. No pertinent family history.       Review of Systems  See history of present illness and past medical history.    Constitutional: Remarkable for no fever or chills  Cardiovascular: Remarkable for nonspecific chest pain or shortness of breath except for the chest discomfort that he had after the impaction of food.  Currently he is free of any symptoms at this time.  Respiratory: Remarkable for increased oxygen requirement but patient himself described no specific symptoms of shortness of breath.  GI: Remarkable for impacted steak as described in history of presenting illness denies any nausea vomiting or diarrhea.  : Remarkable for no burning in urination frequency urgency  Musculoskeletal: Remarkable for no specific joint aches and pain  Neurological:  "Remarkable for no loss of consciousness or continence.      Vitals:   /81 (BP Location: Right arm, Patient Position: Lying)   Pulse 85   Temp 98.5 °F (36.9 °C) (Oral)   Resp 18   Ht 177.8 cm (70\")   Wt 90 kg (198 lb 6.6 oz)   SpO2 94%   BMI 28.47 kg/m²   I/O: No intake or output data in the 24 hours ending 07/13/19 0348  Exam:  General Appearance:    Alert, cooperative, well-nourished male, no distress, appears stated age, lying in PACU does not appear to be in any acute distress.   Head:    Normocephalic, without obvious abnormality, atraumatic   Eyes:    PERRL, conjunctiva/corneas clear, EOM's intact, both eyes   Ears:    Normal external ear canals, both ears   Nose:   Nares normal, septum midline, mucosa normal, no drainage    or sinus tenderness   Throat:   Lips, tongue, gums normal; oral mucosa pink and moist   Neck:   Supple, symmetrical, trachea midline, no adenopathy;     thyroid:  no enlargement/tenderness/nodules; no carotid    bruit or JVD   Back:     Symmetric, no curvature, ROM normal, no CVA tenderness   Lungs:    Bilateral air entry clear to auscultation, no obvious use of accessory muscles of breathing noted   Chest Wall:    No tenderness or deformity    Heart:    Regular rate and rhythm, S1 and S2 normal, no murmur, rub   or gallop   Abdomen:     Soft, non-tender, bowel sounds active all four quadrants,     no masses, no hepatomegaly, no splenomegaly   Extremities:   Extremities normal, atraumatic, no cyanosis or edema   Pulses:   Pulses palpable in all extremities; symmetric all extremities   Skin:   Skin color normal, Skin is warm and dry,  no rashes or palpable lesions   Neurologic:   CNII-XII intact, motor strength grossly intact, sensation grossly intact to light touch, no focal deficits noted       Data Review:      I reviewed the patient's new clinical results.  Results from last 7 days   Lab Units 07/12/19  2330   WBC 10*3/mm3 14.59*   HEMOGLOBIN g/dL 17.1   PLATELETS 10*3/mm3 " 196         Xr Chest 2 View-personally reviewed    Result Date: 7/12/2019  Impression:  1.  No acute abnormality. No pneumomediastinum to indicate esophageal perforation. Signer Name: Danilo Swift MD  Signed: 7/12/2019 10:21 PM  Workstation Name: [id number]     No orders to display       Assessment:  Active Hospital Problems    Diagnosis POA   • **Postoperative hypoxia [R09.02, Z98.890] Unknown   • Foreign body in esophagus [T18.108A] Unknown     Added automatically from request for surgery [id number]     • Dysphagia [R13.10] Unknown     Added automatically from request for surgery [id number]     • Aspiration into airway [T17.908A] Unknown       Medical decision making:  Postoperative hypoxia requiring increased oxygen requirement in the setting of impacted esophageal foreign body and endoscopic disimpaction-plan is to admit the patient monitor his oxygen saturation.  Clinically does not appear to have evidence of aspiration pneumonia and may be at risk of what we can describe his chemical pneumonitis/mental since phenomena.  Plan is to admit the patient monitor his oxygen saturation provide him on as needed oxygen and see how he does.  We will hold onto any specific antibiotic therapy unless his condition changes.  Overall care plan discussed with the patient and his wife and the nursing staff.  Dysphagia secondary to mechanical impaction of foreign body/steak-patient is status post endoscopic removal of foreign body plan is to monitor and continue with GI follow-up.  History of hypertension-continue his antihypertensive regimen.  Abnormal EKG with no associated symptoms plan repeat EKG and cardiology evaluation.    Yariel Steven MD   7/13/2019  3:48 AM  Much of this encounter note is an electronic transcription/translation of spoken language to printed text. The electronic translation of spoken language may permit erroneous, or at times, nonsensical words or phrases to be inadvertently transcribed; Although I have  "reviewed the note for such errors, some may still exist  Important note to protect myself: We are asked to admit the patient after anesthesia recommended the need for overnight observation given the increased oxygen requirement and concern for possible aspiration.  When we requested the nurse to have anesthesia write initial disposition orders because they have evaluated the patient and have looked at the patient in person I was mis-quoted by the nurse to Dr. Carias that \"I have given her hell\".  It is unfortunate that patient should receive initial disposition order from a physician who has not evaluated or taking care of the patient and request to do it properly is seen as personality conflict.  After this conversation I had no choice but to drop everything to protect my reputation and come and see the patient immediately.    Based on my training and practice of medicine for the last 20 years the ideal care pathway would have been a call from a physician/aneasthesiologist to hospitalist service explaining the change in status requiring this admission and initial disposition decision be made by the caring physician who is looking at the patient.  Requesting a proper care program for a patient should not be seen as difficult to deal with or lament the requesting person as somebody having personality disorder.  This creates a very difficult working environment.  "

## 2019-07-13 NOTE — NURSING NOTE
Dr. mtz returned call for consult to admit patient.  Asked to return call to dr mccarthy for initial orders and then [facility] would follow patient in hospital

## 2019-07-13 NOTE — PLAN OF CARE
Problem: Patient Care Overview  Goal: Plan of Care Review  Outcome: Ongoing (interventions implemented as appropriate)   07/13/19 5363   Coping/Psychosocial   Plan of Care Reviewed With patient   Plan of Care Review   Progress improving   OTHER   Outcome Summary VSS. Telemetry monitor, SR with BBB. Up ad kayleen. Room air. Tolerating GI soft diet. Will continue to monitor.

## 2019-07-13 NOTE — ED NOTES
"Pt is sitting outside with red solo cup. States that he \"feels it\" in his esophagus.  Denies soa or any difficulty breathing.     Chloe Franklin RN  07/12/19 2006       hCloe Franklin RN  07/12/19 2006    "

## 2019-07-13 NOTE — CONSULTS
[facility]  Initial Inpatient Consult Note    Referring Provider: Dr. 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 He cannot swallow his spit. He has had similar problems of dysphagia in the past requiring Dr. SIMONA Bains to do an EGD and esophageal dialtion about 7 yrs ago. He has had a 9-10 yr h/o intermittent solids dysphagia. He has never had to go to the ER in the past. He does have some heartburn and takes Prilosec OTC prn. No diarrhea or constipation.  He has no rectal bleeding or melena.  His last colonoscopy was about 5 years ago when a polyp was found.  He was told that he should have a repeat colonoscopy in about 5 years.  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    Past Medical History:  Past Medical History:   Diagnosis Date   • GERD (gastroesophageal reflux disease)    • Hypertension      Past Surgical History:  Past Surgical History:   Procedure Laterality Date   • ESOPHAGEAL DILATATION     • HERNIA REPAIR        Social History:   Social History     Tobacco Use   • Smoking status: Never Smoker   Substance Use Topics   • Alcohol use: No     Frequency: Never      Family History:  History reviewed. No pertinent family history.    Home Meds:    (Not in a hospital admission)  Current Meds:      Allergies:  No Known Allergies  Review of Systems  The following systems were reviewed and negative;  respiratory, cardiovascular, musculoskeletal and neurological     Objective     Vital Signs  Temp:  [98.2 °F (36.8 °C)-98.5 °F (36.9 °C)] 98.5 °F (36.9 °C)  Heart Rate:  [64-84] 84  Resp:  [16-18] 18  BP: (128-138)/(71-98) 128/72  Physical Exam:  General  Appearance:    Alert, cooperative, in no acute distress   Head:    Normocephalic, without obvious abnormality, atraumatic   Eyes:            Lids and lashes normal, conjunctivae and sclerae normal, no   icterus   Throat:   No oral lesions, no thrush, oral mucosa moist   Neck:   No adenopathy, supple, trachea midline, no thyromegaly, no   carotid bruit, no JVD   Lungs:     Clear to auscultation,respirations regular, even and                   unlabored    Heart:    Regular rhythm and normal rate, normal S1 and S2, no            murmur, no gallop, no rub, no click   Chest Wall:    No abnormalities observed   Abdomen:     Normal bowel sounds, no masses, no organomegaly, soft        non-tender, non-distended, no guarding, no rebound                 tenderness   Rectal:     Deferred   Extremities:   no edema, no cyanosis, no redness   Skin:   No bleeding, bruising or rash   Lymph nodes:   No palpable adenopathy   Psychiatric:  Judgement and insight: normal   Orientation to person place and time: normal   Mood and affect: normal   Results Review:   I reviewed the patient's new clinical results.    Results from last 7 days   Lab Units 07/12/19  2330   WBC 10*3/mm3 14.59*   HEMOGLOBIN g/dL 17.1   HEMATOCRIT % 51.8*   PLATELETS 10*3/mm3 196           Invalid input(s): LABALBU, PROT      No results found for: LIPASE    Radiology:  No orders to display       Assessment/Plan   There is no problem list on file for this patient.      I discussed the patients findings and my recommendations with patient, family and nursing staff.     Assessment:  1.  Long history of intermittent solids dysphasia.  2.  Long history of heartburn requiring him to take Prilosec over-the-counter as needed.  3.  Patient now appears to have an esophageal foreign body that is stuck.  The patient cannot swallow his spit.  4.  Patient has a history of colon polyps.  He is due for another colonoscopy.    Recommendations:  1.  I will plan to do an EGD tonight  to remove the esophageal foreign body.  I will possibly dilate his esophagus?  2.  He should probably have a colonoscopy as an outpatient at some point soon.    Glenn Carias MD

## 2019-07-13 NOTE — PROGRESS NOTES
LOS: 0 days     Name: Fabio Zhang  Age/Sex: 68 y.o. male  :  1950        PCP: Provider, No Known  Chief Complaint   Patient presents with   • food bolus      Subjective   Overall doing better.  Still hurts to take a deep breath.  Still having some pain in his esophagus as well.  General: No Fever or Chills, Cardiac: No Chest Pain or Palpitations, Resp: No Cough or SOA, GI: No Nausea, Vomiting, or Diarrhea and Other: No bleeding      losartan 100 mg Oral Daily   pantoprazole 40 mg Oral QAM   piperacillin-tazobactam 3.375 g Intravenous Q8H   sodium chloride 3 mL Intravenous Q12H       lactated ringers 9 mL/hr    Pharmacy to Dose Zosyn     sodium chloride 125 mL/hr Last Rate: 125 mL/hr (19 0630)       Objective   Vital Signs  Temp:  [98.2 °F (36.8 °C)-98.6 °F (37 °C)] 98.6 °F (37 °C)  Heart Rate:  [] 99  Resp:  [16-20] 18  BP: ()/(53-98) 110/74  Body mass index is 28.47 kg/m².    Intake/Output Summary (Last 24 hours) at 2019 1439  Last data filed at 2019 1226  Gross per 24 hour   Intake 1470 ml   Output 400 ml   Net 1070 ml       Physical Exam   Constitutional: He is oriented to person, place, and time. He appears well-developed and well-nourished.   HENT:   Head: Normocephalic and atraumatic.   Cardiovascular: Normal rate, regular rhythm and normal heart sounds.   Pulmonary/Chest: Effort normal and breath sounds normal.   Abdominal: Soft. Bowel sounds are normal.   Neurological: He is alert and oriented to person, place, and time.   Skin: Skin is warm.   Psychiatric: He has a normal mood and affect. His behavior is normal.   Nursing note and vitals reviewed.        Results Review:       I reviewed the patient's new clinical results.  Results from last 7 days   Lab Units 19  0644 19  2330   WBC 10*3/mm3 8.09 14.59*   HEMOGLOBIN g/dL 17.5 17.1   PLATELETS 10*3/mm3 188 196     Results from last 7 days   Lab Units 19  0644   SODIUM mmol/L 137   POTASSIUM mmol/L  4.4   CHLORIDE mmol/L 102   CO2 mmol/L 24.5   BUN mg/dL 15   CREATININE mg/dL 1.01   CALCIUM mg/dL 8.8   Estimated Creatinine Clearance: 81.2 mL/min (by C-G formula based on SCr of 1.01 mg/dL).      Assessment/Plan     Postoperative hypoxia    Foreign body in esophagus    Dysphagia    Aspiration into airway      PLAN  1.  Aspiration pneumonitis: I do not feel like he is got pneumonia.  I am to stop the Zosyn and DC his IV fluids.  I reviewed his chest x-ray looks more like atelectasis to me.  We will get him an incentive spirometer.  2.  Postoperative respiratory failure: He was hypoxic requiring up to 5 L of oxygen.  He is currently off oxygen and doing much better.  I suspect with continued aggressive pulmonary hygiene he will not require any oxygen following discharge  3.  Right bundle branch block: Plan further outpatient work-up.  Discussed with Dr. Harvey  4.  Foreign body in the esophagus: Responded well to endoscopic removal.  He still having some pain and tenderness there but otherwise is shown good improvement.      Disposition  Plan Home in the morning      Ash Duong MD  [facility]  07/13/19  2:39 PM

## 2019-07-13 NOTE — NURSING NOTE
SCOTTIE paged per Dr. Weber request to admit patient for observation due to low O2 sats post op.  Requiring hi kishan nasal cannula for 93% saturation

## 2019-07-13 NOTE — ED NOTES
Pt states he feels improvement, felt as if bolus has moved, able to drink small sips of water but continues to have saliva that he can't swallow fullly, dr marshall informed     Yazmin Castillo RN  07/12/19 2108

## 2019-07-13 NOTE — ANESTHESIA POSTPROCEDURE EVALUATION
"Patient: Fabio Zhang    Procedure Summary     Date:  07/13/19 Room / Location:  [facility] OR  / [facility] MAIN OR    Anesthesia Start:  0135 Anesthesia Stop:  0238    Procedure:  ESOPHAGOGASTRODUODENOSCOPY for foreign body removal, balloon dilation, biopsies (N/A Esophagus) Diagnosis:       Foreign body in esophagus, initial encounter      Dysphagia, unspecified type      (Foreign body in esophagus, initial encounter [T18.108A])      (Dysphagia, unspecified type [R13.10])    Surgeon:  Glenn Carias MD Provider:  Stephanie Weber MD    Anesthesia Type:  general ASA Status:  2 - Emergent          Anesthesia Type: general  Last vitals  BP   102/66 (07/13/19 0315)   Temp   36.9 °C (98.5 °F) (07/13/19 0230)   Pulse   84 (07/13/19 0315)   Resp   18 (07/13/19 0315)     SpO2   94 % (07/13/19 0315)     Post Anesthesia Care and Evaluation    Patient location during evaluation: PACU  Patient participation: complete - patient participated  Level of consciousness: awake  Pain management: adequate  Airway patency: patent  Anesthetic complications: No anesthetic complications    Cardiovascular status: acceptable  Respiratory status: acceptable and nasal cannula  Hydration status: acceptable    Comments: /66 (BP Location: Right arm, Patient Position: Lying)   Pulse 84   Temp 36.9 °C (98.5 °F) (Oral)   Resp 18   Ht 177.8 cm (70\")   Wt 90 kg (198 lb 6.6 oz)   SpO2 94%   BMI 28.47 kg/m²   Patient being admitted for suspected aspiration.         "

## 2019-07-13 NOTE — CONSULTS
Date of Hospital Visit: 19  Encounter Provider: Evelyn Schmid, RN EXTENDER  Place of Service: [facility] CARDIOLOGY  Patient Name: Fabio Zhang  :1950  [id number]  Referral Provider: No ref. provider found    Chief complaint: abnormal EKG    History of Present Illness:  Mr. Zhang is a 68 year old man with history of hypertension, hypothyroidism, dysphagia/ esophageal dilation, and sleep apnea. He presented to ED at [facility] on 19 with after getting a piece of steak was stuck in his mid-esophagus. The obstruction was not able to be cleared and he was transferred to [facility] for EGD with Dr. Carias to remove foreign body. EGD also showed some benign stenosis and his esophagus was dilated. During evaluation, he was noted to have a right bundle branch block and LVH on his EKG. He denies chest pain and troponin has been <0.01 x 2.       Past Medical History:   Diagnosis Date   • GERD (gastroesophageal reflux disease)    • Hypertension        Past Surgical History:   Procedure Laterality Date   • ESOPHAGEAL DILATATION     • HERNIA REPAIR         Medications Prior to Admission   Medication Sig Dispense Refill Last Dose   • losartan (COZAAR) 100 MG tablet Take 100 mg by mouth Daily.   2019 at Unknown time   • omeprazole (priLOSEC) 20 MG capsule Take 20 mg by mouth Daily.   2019 at Unknown time       Current Meds  Scheduled Meds:  losartan 100 mg Oral Daily   pantoprazole 40 mg Oral QAM   sodium chloride 3 mL Intravenous Q12H     Continuous Infusions:  lactated ringers 9 mL/hr    Pharmacy to Dose Zosyn     sodium chloride 125 mL/hr Last Rate: 125 mL/hr (19 0630)     PRN Meds:.•  acetaminophen  •  nitroglycerin  •  Pharmacy to Dose Zosyn  •  [COMPLETED] Insert peripheral IV **AND** sodium chloride  •  sodium chloride    Allergies as of 2019   • (No Known Allergies)       Social History     Socioeconomic History   • Marital status:      Spouse  "name: Not on file   • Number of children: Not on file   • Years of education: Not on file   • Highest education level: Not on file   Tobacco Use   • Smoking status: Never Smoker   Substance and Sexual Activity   • Alcohol use: No     Frequency: Never   • Drug use: No       History reviewed. No pertinent family history.    REVIEW OF SYSTEMS:   ROS was performed and is negative except as outlined in HPI     REVIEW OF SYSTEMS:   CONSTITUTIONAL: No weight loss, fever, chills, weakness or fatigue.   HEENT: Eyes: No visual loss, blurred vision, double vision or yellow sclerae. Ears, Nose, Throat: No hearing loss, sneezing, congestion, runny nose or sore throat.   SKIN: No rash or itching.     RESPIRATORY: No shortness of breath, hemoptysis, cough or sputum.   GASTROINTESTINAL: No anorexia, nausea, vomiting or diarrhea. No abdominal pain, bright red blood per rectum or melena.  GENITOURINARY: No burning on urination, hematuria or increased frequency.  NEUROLOGICAL: No headache, dizziness, syncope, paralysis, ataxia, numbness or tingling in the extremities. No change in bowel or bladder control.   MUSCULOSKELETAL: No muscle, back pain, joint pain or stiffness.   HEMATOLOGIC: No anemia, bleeding or bruising.   LYMPHATICS: No enlarged nodes. No history of splenectomy.   PSYCHIATRIC: No history of depression, anxiety, hallucinations.   ENDOCRINOLOGIC: No reports of sweating, cold or heat intolerance. No polyuria or polydipsia.        Objective:   Temp:  [98.2 °F (36.8 °C)-98.6 °F (37 °C)] 98.6 °F (37 °C)  Heart Rate:  [] 111  Resp:  [16-20] 18  BP: ()/(53-98) 110/74  Body mass index is 28.47 kg/m².  Flowsheet Rows      First Filed Value   Admission Height  177.8 cm (70\") Documented at 07/12/2019 2243   Admission Weight  90 kg (198 lb 6.6 oz) Documented at 07/12/2019 2243        Vitals:    07/13/19 1022   BP:    Pulse:    Resp:    Temp:    SpO2: 96%       Head:    Normocephalic, without obvious abnormality, " atraumatic   Eyes:            Lids and lashes normal, conjunctivae and sclerae normal, no   icterus, no pallor   Ears:    Ears appear intact with no abnormalities noted   Throat:   No oral lesions, dentition good   Neck:   No adenopathy, supple, trachea midline, no thyromegaly, no   carotid bruit, no JVD   Lungs:     Breath sounds are equal and clear to auscultation    Heart:    Normal S1 and S2, RRR, No M/G/R   Abdomen:     Normal bowel sounds, no masses, no organomegaly, soft        non-tender, non-distended, no guarding   Extremities:   Moves all extremities well, no edema, no cyanosis, no redness   Pulses:   Pulses palpable and equal bilaterally.    Skin:  Psychiatric:   No bleeding, bruising or rash    Awake, alert and oriented x 3, normal mood and affect           Telemetry:          EKG:            I personally viewed and interpreted the patient's EKG/Telemetry data    Assessment:  Active Hospital Problems    Diagnosis  POA   • **Postoperative hypoxia [R09.02, Z98.890]  Unknown   • Foreign body in esophagus [T18.108A]  Unknown   • Dysphagia [R13.10]  Unknown   • Aspiration into airway [T17.908A]  Unknown      Resolved Hospital Problems   No resolved problems to display.       Plan:    68 years old male with a foreign body in esophagus dysphagia, also had a hypoxia, was found to have a EKG abnormal with a right bundle branch block has no chest pains no shortness of breath has minimum cardiac risk factors will need a echocardiogram as well as a stress test, can be done as an outpatient    Aman Harvey MD  07/13/19  10:27 AM.

## 2019-07-13 NOTE — ED PROVIDER NOTES
Subjective   History of Present Illness  History of Present Illness    Chief complaint: Feels like steak is stuck    Location: Esophagus    Quality/Severity: Unable to swallow liquid    Timing/Onset/Duration: Noted at 6:15 PM tonight    Modifying Factors: Patient unable to swallow liquids    Associated Symptoms: No fever or chills.  No shortness of breath.  No chest pain except for midesophagus.    Narrative: This 68-year-old white male with history of esophageal food impaction in the past, was eating steak at 6:15 PM tonight and felt like food got stuck.  The patient has no other complaints.    PCP: No known provider      Review of Systems   Constitutional: Negative for chills and fever.   Respiratory:        Feels like food is stuck in the esophagus   Gastrointestinal: Negative for abdominal pain and nausea.        Spitting up saliva        Medication List      ASK your doctor about these medications    losartan 100 MG tablet  Commonly known as:  COZAAR     omeprazole 20 MG capsule  Commonly known as:  priLOSEC          Past Medical History:   Diagnosis Date   • GERD (gastroesophageal reflux disease)    • Hypertension        No Known Allergies    Past Surgical History:   Procedure Laterality Date   • ESOPHAGEAL DILATATION     • HERNIA REPAIR         History reviewed. No pertinent family history.    Social History     Socioeconomic History   • Marital status:      Spouse name: Not on file   • Number of children: Not on file   • Years of education: Not on file   • Highest education level: Not on file   Tobacco Use   • Smoking status: Never Smoker   Substance and Sexual Activity   • Alcohol use: No     Frequency: Never   • Drug use: No           Objective   Physical Exam   Constitutional: He is oriented to person, place, and time. He appears well-developed and well-nourished. No distress.   ED Triage Vitals (07/12/19 2029)  Temp: 98.2 °F (36.8 °C)  Heart Rate: 64  Resp: 16  BP: 138/71  SpO2: 99 %  Temp src:  Oral  Heart Rate Source: Monitor  Patient Position: Sitting  BP Location: Right arm  FiO2 (%): n/a    The patient's vitals were reviewed by me.  Unless otherwise noted they are within normal limits.     HENT:   Head: Normocephalic and atraumatic.   Nose: Nose normal.   Mouth/Throat: Oropharynx is clear and moist. No oropharyngeal exudate.   Pulmonary/Chest: Effort normal and breath sounds normal.   Abdominal: Soft. Bowel sounds are normal. He exhibits no distension and no mass. There is no tenderness. There is no rebound and no guarding. No hernia.   Musculoskeletal: He exhibits no edema.   Neurological: He is alert and oriented to person, place, and time.   Skin: Skin is warm and dry. No rash noted.   Psychiatric: He has a normal mood and affect.   Nursing note and vitals reviewed.      Procedures           ED Course        9:23 PM, 07/12/19:  The patient was given glucagon, he still cannot tolerate clear liquids.  He is spitting up his saliva.    9:24 PM, 07/12/19:  GI on call at [facility] was paged out.  We have no GI coverage here at [facility].    9:24 PM, 07/12/19:  The patient's diagnosis of esophageal food impaction was discussed with him.  The plan for transfer to [facility] for endoscopy for removal of food impaction was discussed with the patient.  All of his questions were answered he will be transferred in stable condition.  Patient has been advised not to eat or drink anything else.    9:33 PM, 07/12/19:  I spoke with Dr. Carias, on-call for GI at [facility].  He states that we could transfer the patient to the emergency department but he would not accept the patient directly.    9:38 PM, 07/12/19:  I spoke with Dr. Dominguez, the emergency attending at [facility], he will accept the patient.    9:39 PM, 07/12/19:  An EKG was obtained at 2135.  EKG was read by me at 2136.  EKG shows a normal sinus rhythm with rate of 61.   There is a right bundle branch block and left ventricular hypertrophy.  The VT, and QT intervals are unremarkable.  There is no acute ST elevation or depression.        MDM    No orders to display     Labs Reviewed - No data to display  No results found.    Final diagnoses:   Food impaction of esophagus, initial encounter         ED Medications:  Medications   glucagon (GLUCAGEN) injection 1 mg (1 mg Intravenous Given 7/12/19 2053)       New Medications:     Medication List      ASK your doctor about these medications    losartan 100 MG tablet  Commonly known as:  COZAAR     omeprazole 20 MG capsule  Commonly known as:  priLOSEC          Stopped Medications:     Medication List      ASK your doctor about these medications    losartan 100 MG tablet  Commonly known as:  COZAAR     omeprazole 20 MG capsule  Commonly known as:  priLOSEC            Final diagnoses:   Food impaction of esophagus, initial encounter            Breezy Tong MD  07/12/19 2125       Breezy Tong MD  07/12/19 2203

## 2019-07-13 NOTE — ED PROVIDER NOTES
EMERGENCY DEPARTMENT ENCOUNTER    CHIEF COMPLAINT  Chief Complaint: Food bolus  History given by: Patient  History limited by: None  Room Number: 10/10  PMD: Provider, No Known      HPI:  Pt is a 68 y.o. male who presents stating he has a piece of steak stuck in his esophagus that began earlier tonight. He is unable to tolerate his own secretions, but denies any SOA. He was seen at [facility] for the same complaints, but was referred here, as they have no GI coverage. He was given Glucagon at [facility] without improvement. He reports he had a esophogeal dilation several years ago by Dr. Bains.    Duration: Began earlier tonight  Timing: Constant  Intensity/Severity: Moderate  Progression: Unchanged  Associated Symptoms: None stated  Previous Episodes:  He was seen at [facility] for the same complaints, but was referred here, as they have no GI coverage.  Treatment before arrival: He was given Glucagon at [facility] without improvement.    PAST MEDICAL HISTORY  Active Ambulatory Problems     Diagnosis Date Noted   • No Active Ambulatory Problems     Resolved Ambulatory Problems     Diagnosis Date Noted   • No Resolved Ambulatory Problems     Past Medical History:   Diagnosis Date   • GERD (gastroesophageal reflux disease)    • Hypertension        PAST SURGICAL HISTORY  Past Surgical History:   Procedure Laterality Date   • ESOPHAGEAL DILATATION     • HERNIA REPAIR         FAMILY HISTORY  History reviewed. No pertinent family history.    SOCIAL HISTORY  Social History     Socioeconomic History   • Marital status:      Spouse name: Not on file   • Number of children: Not on file   • Years of education: Not on file   • Highest education level: Not on file   Tobacco Use   • Smoking status: Never Smoker   Substance and Sexual Activity   • Alcohol use: No     Frequency: Never   • Drug use: No       ALLERGIES  Patient has no known allergies.    REVIEW OF SYSTEMS  Review of Systems    Constitutional: Negative for activity change, appetite change and fever.   HENT: Negative for congestion and sore throat.         Pt reports a piece of steak stuck in his esophagus. He is unable to tolerate his own secretions.   Eyes: Negative.    Respiratory: Negative for cough and shortness of breath.    Cardiovascular: Negative for chest pain and leg swelling.   Gastrointestinal: Negative for abdominal pain, diarrhea and vomiting.   Endocrine: Negative.    Genitourinary: Negative for decreased urine volume and dysuria.   Musculoskeletal: Negative for neck pain.   Skin: Negative for rash and wound.   Allergic/Immunologic: Negative.    Neurological: Negative for weakness, numbness and headaches.   Hematological: Negative.    Psychiatric/Behavioral: Negative.    All other systems reviewed and are negative.      PHYSICAL EXAM  ED Triage Vitals [07/12/19 2243]   Temp Heart Rate Resp BP SpO2   98.5 °F (36.9 °C) 84 18 128/72 97 %      Temp src Heart Rate Source Patient Position BP Location FiO2 (%)   Tympanic -- -- -- --       Physical Exam   Constitutional: He is oriented to person, place, and time and well-developed, well-nourished, and in no distress. No distress.   HENT:   Head: Normocephalic and atraumatic.   Eyes: EOM are normal. Pupils are equal, round, and reactive to light.   Neck: Normal range of motion. Neck supple.   Cardiovascular: Normal rate, regular rhythm and normal heart sounds.   Pulmonary/Chest: Effort normal and breath sounds normal. No respiratory distress.   Abdominal: Soft. There is no tenderness. There is no rebound and no guarding.   Musculoskeletal: Normal range of motion. He exhibits no edema.   Neurological: He is alert and oriented to person, place, and time. He has normal sensation and normal strength.   Skin: Skin is warm and dry.   Psychiatric: Mood and affect normal.   Nursing note and vitals reviewed.      LAB RESULTS  Lab Results (last 24 hours)     Procedure Component Value Units  Date/Time    Troponin [[id number]]  (Normal) Collected:  07/12/19 2132    Specimen:  Blood Updated:  07/12/19 2158     Troponin T <0.010 ng/mL     Narrative:       Troponin T Reference Range:  <= 0.03 ng/mL-   Negative for AMI  >0.03 ng/mL-     Abnormal for myocardial necrosis.  Clinicians would have to utilize clinical acumen, EKG, Troponin and serial changes to determine if it is an Acute Myocardial Infarction or myocardial injury due to an underlying chronic condition.     CBC & Differential [[id number]] Collected:  07/12/19 2330    Specimen:  Blood Updated:  07/12/19 2336    Narrative:       The following orders were created for panel order CBC & Differential.  Procedure                               Abnormality         Status                     ---------                               -----------         ------                     CBC Auto Differential[[id number]]                            In process                   Please view results for these tests on the individual orders.    CBC Auto Differential [[id number]] Collected:  07/12/19 2330    Specimen:  Blood Updated:  07/12/19 2336          I ordered the above labs and reviewed the results    PROGRESS AND CONSULTS     2318 Placed call to GI for consult.    2321 Discussed pt with JOSEPHINE Pollock, who requests labs and a CXR be obtained, then he will take him to the OR to remove the bolus.    2324 Ordered CBC, CMP, and CXR for further evaluation.    2336 Per radiology tech, pt states he had a 2 view CXR done earlier today at [facility]. I have reviewed those images and cancelled the request for repeat CXR.    2338 Rechecked with pt and informed him of the conversation I had with Dr. Carias and plan to send pt to OR. Pt understands and agrees with the plan, all questions answered.    MEDICAL DECISION MAKING  Results were reviewed/discussed with the patient and they were also made aware of online access. Pt also made aware that some labs, such as cultures, will  not be resulted during ER visit and follow up with PMD is necessary.     MDM  Number of Diagnoses or Management Options  Foreign body in esophagus, initial encounter:      Amount and/or Complexity of Data Reviewed  Clinical lab tests: ordered and reviewed  Tests in the radiology section of CPT®: reviewed (2 view CXR from [facility]  Decide to obtain previous medical records or to obtain history from someone other than the patient: yes  Discuss the patient with other providers: yes (Dr. Carias, GI.)    Patient Progress  Patient progress: stable         DIAGNOSIS  Final diagnoses:   Foreign body in esophagus, initial encounter       DISPOSITION  SEND TO OR    Discussed treatment plan and reason for admission with pt/family and admitting physician. Pt/family voiced understanding of the plan for admission for further testing/treatment as needed.     Latest Documented Vital Signs:  As of 11:44 PM  BP- 128/72 HR- 84 Temp- 98.5 °F (36.9 °C) (Tympanic) O2 sat- 97%    --  Documentation assistance provided by arline Ireland for Dr. Wright.  Information recorded by the scribe was done at my direction and has been verified and validated by me.     Chloe Ireland  07/12/19 4874       Lakhwinder Wright MD  07/13/19 2853

## 2019-07-13 NOTE — PROGRESS NOTES
"Pharmacy Consult - Zosyn Dosing (Initial Note)    Fabio Zhang is on day 1 pharmacy to dose zosyn for PNA.  Pharmacy dosing zosyn per Dr. Carias's request.     Relevant clinical data and objective history reviewed:  68 y.o. male 177.8 cm (70\") 90 kg (198 lb 6.6 oz)    Vital Signs (last 24 hours)       07/12 0700  -  07/13 0659 07/13 0700  -  07/13 1122   Most Recent    Temp (°F) 98.2 -  98.5      98.6     98.6 (37)    Heart Rate 64 -  98      111     111    Resp 16 -  20      18     18    BP 92/58 -  138/71      110/74     110/74    SpO2 (%) 91 -  99    95 -  96     96          Creatinine   Date Value Ref Range Status   07/13/2019 1.01 0.76 - 1.27 mg/dL Final     BUN   Date Value Ref Range Status   07/13/2019 15 8 - 23 mg/dL Final     Estimated Creatinine Clearance: 81.2 mL/min (by C-G formula based on SCr of 1.01 mg/dL).    Lab Results   Component Value Date    WBC 8.09 07/13/2019       Baseline culture/source/susceptibility:   None  Chest xray pending    Anti-Infectives (From admission, onward)    Ordered     Dose/Rate Route Frequency Start Stop    07/13/19 1121  piperacillin-tazobactam (ZOSYN) 3.375 g in iso-osmotic dextrose 50 ml (premix)     Ordering Provider:  Glenn Carias MD    3.375 g  over 4 Hours Intravenous Every 8 Hours 07/13/19 1230 07/20/19 1229    07/13/19 0337  piperacillin-tazobactam (ZOSYN) 3.375 g in iso-osmotic dextrose 50 ml (premix)     Ordering Provider:  Glenn Carias MD    3.375 g  over 30 Minutes Intravenous Once 07/13/19 0403 07/13/19 0659    07/13/19 0356  Pharmacy to Dose Zosyn     Ordering Provider:  Glenn Carias MD     Does not apply Continuous PRN 07/13/19 0355 07/20/19 0354           Assessment/Plan  Reviewed chart.  Renal function is stable.     Patient received zosyn 3.375gm iv x 1 at 0630 7/13, will continue zosyn 3.375 gm iv q8h over 4 hours per [facility] protocol. Pharmacy will continue to follow daily while on zosyn and adjust as needed.     Светлана Rhodes, PharmD, BCPS  "

## 2019-07-13 NOTE — ED NOTES
Pt to ED via ambulance. Pt with steak in throat since 1815. Pt given 1 mg glucagon and 1L NS.      Siobhan York, RN  07/12/19 3343

## 2019-07-14 VITALS
SYSTOLIC BLOOD PRESSURE: 103 MMHG | HEIGHT: 70 IN | WEIGHT: 198.41 LBS | OXYGEN SATURATION: 95 % | HEART RATE: 82 BPM | TEMPERATURE: 98.1 F | BODY MASS INDEX: 28.41 KG/M2 | RESPIRATION RATE: 20 BRPM | DIASTOLIC BLOOD PRESSURE: 73 MMHG

## 2019-07-14 LAB
ALBUMIN SERPL-MCNC: 3.9 G/DL (ref 3.5–5.2)
ALBUMIN/GLOB SERPL: 1.3 G/DL
ALP SERPL-CCNC: 51 U/L (ref 39–117)
ALT SERPL W P-5'-P-CCNC: 25 U/L (ref 1–41)
ANION GAP SERPL CALCULATED.3IONS-SCNC: 10.8 MMOL/L (ref 5–15)
AST SERPL-CCNC: 27 U/L (ref 1–40)
BASOPHILS # BLD AUTO: 0.04 10*3/MM3 (ref 0–0.2)
BASOPHILS NFR BLD AUTO: 0.3 % (ref 0–1.5)
BILIRUB SERPL-MCNC: 4.2 MG/DL (ref 0.2–1.2)
BUN BLD-MCNC: 16 MG/DL (ref 8–23)
BUN/CREAT SERPL: 12.2 (ref 7–25)
CALCIUM SPEC-SCNC: 8.6 MG/DL (ref 8.6–10.5)
CHLORIDE SERPL-SCNC: 97 MMOL/L (ref 98–107)
CK SERPL-CCNC: 251 U/L (ref 20–200)
CO2 SERPL-SCNC: 28.2 MMOL/L (ref 22–29)
CREAT BLD-MCNC: 1.31 MG/DL (ref 0.76–1.27)
DEPRECATED RDW RBC AUTO: 44.3 FL (ref 37–54)
EOSINOPHIL # BLD AUTO: 0.06 10*3/MM3 (ref 0–0.4)
EOSINOPHIL NFR BLD AUTO: 0.5 % (ref 0.3–6.2)
ERYTHROCYTE [DISTWIDTH] IN BLOOD BY AUTOMATED COUNT: 12.8 % (ref 12.3–15.4)
GFR SERPL CREATININE-BSD FRML MDRD: 55 ML/MIN/1.73
GLOBULIN UR ELPH-MCNC: 3 GM/DL
GLUCOSE BLD-MCNC: 101 MG/DL (ref 65–99)
HCT VFR BLD AUTO: 48.6 % (ref 37.5–51)
HGB BLD-MCNC: 16.4 G/DL (ref 13–17.7)
IMM GRANULOCYTES # BLD AUTO: 0.03 10*3/MM3 (ref 0–0.05)
IMM GRANULOCYTES NFR BLD AUTO: 0.3 % (ref 0–0.5)
LYMPHOCYTES # BLD AUTO: 2.47 10*3/MM3 (ref 0.7–3.1)
LYMPHOCYTES NFR BLD AUTO: 21.3 % (ref 19.6–45.3)
MCH RBC QN AUTO: 31.9 PG (ref 26.6–33)
MCHC RBC AUTO-ENTMCNC: 33.7 G/DL (ref 31.5–35.7)
MCV RBC AUTO: 94.6 FL (ref 79–97)
MONOCYTES # BLD AUTO: 1.11 10*3/MM3 (ref 0.1–0.9)
MONOCYTES NFR BLD AUTO: 9.6 % (ref 5–12)
NEUTROPHILS # BLD AUTO: 7.91 10*3/MM3 (ref 1.7–7)
NEUTROPHILS NFR BLD AUTO: 68 % (ref 42.7–76)
NRBC BLD AUTO-RTO: 0 /100 WBC (ref 0–0.2)
PLATELET # BLD AUTO: 173 10*3/MM3 (ref 140–450)
PMV BLD AUTO: 10.1 FL (ref 6–12)
POTASSIUM BLD-SCNC: 4 MMOL/L (ref 3.5–5.2)
PROT SERPL-MCNC: 6.9 G/DL (ref 6–8.5)
RBC # BLD AUTO: 5.14 10*6/MM3 (ref 4.14–5.8)
SODIUM BLD-SCNC: 136 MMOL/L (ref 136–145)
WBC NRBC COR # BLD: 11.62 10*3/MM3 (ref 3.4–10.8)

## 2019-07-14 PROCEDURE — 80053 COMPREHEN METABOLIC PANEL: CPT | Performed by: INTERNAL MEDICINE

## 2019-07-14 PROCEDURE — G0378 HOSPITAL OBSERVATION PER HR: HCPCS

## 2019-07-14 PROCEDURE — 82550 ASSAY OF CK (CPK): CPT | Performed by: HOSPITALIST

## 2019-07-14 PROCEDURE — 85025 COMPLETE CBC W/AUTO DIFF WBC: CPT | Performed by: INTERNAL MEDICINE

## 2019-07-14 RX ADMIN — PANTOPRAZOLE SODIUM 40 MG: 40 TABLET, DELAYED RELEASE ORAL at 06:28

## 2019-07-14 RX ADMIN — LOSARTAN POTASSIUM 100 MG: 100 TABLET, FILM COATED ORAL at 08:46

## 2019-07-14 NOTE — PLAN OF CARE
Problem: Patient Care Overview  Goal: Plan of Care Review  Outcome: Ongoing (interventions implemented as appropriate)   07/14/19 0910   Coping/Psychosocial   Plan of Care Reviewed With patient   Plan of Care Review   Progress improving   OTHER   Outcome Summary VSS. Telemetry monitor, SR. Up ad kayleen. Ambulating in hallway. Room air. Will proceed with discharge.

## 2019-07-14 NOTE — PLAN OF CARE
Problem: Patient Care Overview  Goal: Plan of Care Review  Outcome: Ongoing (interventions implemented as appropriate)   07/14/19 0337   Coping/Psychosocial   Plan of Care Reviewed With patient   Plan of Care Review   Progress improving   OTHER   Outcome Summary No s/sx distress noted at this time.Rested well throughout night. Vital signs WDL. Tolerated no 02. Will cont to monitor.      Goal: Discharge Needs Assessment  Outcome: Ongoing (interventions implemented as appropriate)    Goal: Interprofessional Rounds/Family Conf  Outcome: Ongoing (interventions implemented as appropriate)      Problem: Fall Risk (Adult)  Goal: Identify Related Risk Factors and Signs and Symptoms  Outcome: Ongoing (interventions implemented as appropriate)    Goal: Absence of Fall  Outcome: Ongoing (interventions implemented as appropriate)

## 2019-07-14 NOTE — DISCHARGE SUMMARY
Patient Name: Fabio Zhang  : 1950  MRN: [id number]    Date of Admission: 2019  Date of Discharge:  2019  Primary Care Physician: Provider, No Known      Chief Complaint:   food bolus      Discharge Diagnoses     Active Hospital Problems    Diagnosis  POA   • **Postoperative hypoxia [R09.02, Z98.890]  Unknown   • Foreign body in esophagus [T18.108A]  Unknown   • Dysphagia [R13.10]  Unknown   • Aspiration into airway [T17.908A]  Unknown      Resolved Hospital Problems   No resolved problems to display.        Hospital Course     Mr. Zhang is a 68 y.o. male non-smoker with no significant past medical history who presented to [facility] initially complaining of foreign body in esophagus.  Please see the admitting history and physical for further details.  He was found to have hypoxia following endoscopy and was admitted to the hospital for further evaluation and treatment.  He presented to an outside hospital was found to have a foreign body within his esophagus.  He was transferred here for endoscopic evaluation.  He was taken to the operating room intubated and had a procedure for removal of the foreign body.  He tolerated that procedure well the post procedure had some issues with hypoxia.  He was requiring up to 5 L oxygen at one point.  He was admitted to our service for stabilization and evaluation of this postoperative hypoxia.  It was probably secondary to some aspiration pneumonitis and postoperative atelectasis.  With incentive spirometry and pulmonary hygiene his oxygen was quickly titrated in the morning.  He was placed on Zosyn and received 2 doses here in the hospital.  This was stopped in my evaluation.  He had no significant leukocytosis was feeling better had no fevers and no significant cough.  I felt like because of that is unlikely he had developed a true pneumonia.  He was monitored overnight required no oxygen and at this point is stable for discharge home.  I  discussed the plan with both him and his wife at the bedside and all are in agreement.  I did provide him with a prescription for Augmentin.  If he starts having fevers chills or feeling bad he can fill this prescription and complete a 7-day course of antibiotics but at this point will hold off unless he develops further symptoms.  Follow-up is been arranged in the outpatient setting and at this point he stable for discharge home.           Day of Discharge     Overall he is feeling better he just feels kind of sore all over.  Denies any fevers or chills overnight.  Is done well off antibiotics.  Hypoxia resolved in the morning yesterday and required no oxygen overnight    Physical Exam:  Temp:  [98.1 °F (36.7 °C)-98.7 °F (37.1 °C)] 98.1 °F (36.7 °C)  Heart Rate:  [81-99] 82  Resp:  [18-20] 20  BP: (101-106)/(62-73) 103/73  Body mass index is 28.47 kg/m².  Physical Exam   Constitutional: He is oriented to person, place, and time. He appears well-developed and well-nourished.   HENT:   Head: Normocephalic and atraumatic.   Cardiovascular: Normal rate and regular rhythm.   Pulmonary/Chest: Effort normal and breath sounds normal.   Abdominal: Soft. Bowel sounds are normal.   Neurological: He is alert and oriented to person, place, and time.   Skin: Skin is warm and dry.   Psychiatric: He has a normal mood and affect. His behavior is normal.   Nursing note and vitals reviewed.      Consultants     Consult Orders (all) (From admission, onward)    Start     Ordered    07/13/19 0405  Inpatient Cardiology Consult  Once     Specialty:  Cardiology  Provider:  Rajesh Adorno MD    07/13/19 0405 07/13/19 0405  Inpatient Gastroenterology Consult  Once     Specialty:  Gastroenterology  Provider:  Glenn Carias MD    07/13/19 0405    07/13/19 0247  Inpatient Hospitalist Consult  STAT     Specialty:  Hospitalist  Provider:  (Not yet assigned)    07/13/19 0247    07/12/19 1719  Gastroenterology (on-call MD unless specified)   Once     Specialty:  Gastroenterology  Provider:  Glenn Carias MD    07/12/19 9308        Consulting Physician(s)     Provider Relationship Specialty    Glenn Carias MD Consulting Physician Gastroenterology    Rajesh Adorno MD Consulting Physician Cardiology    Ash Duong MD Consulting Physician Hospitalist        Procedures     ESOPHAGOGASTRODUODENOSCOPY for foreign body removal, balloon dilation, biopsies    Imaging Results (all)     Procedure Component Value Units Date/Time    XR Chest PA & Lateral [[id number]] Collected:  07/13/19 1121     Updated:  07/13/19 1126    Narrative:       TWO-VIEW CHEST     HISTORY: Possible aspiration pneumonia. Decreased oxygen saturation.     FINDINGS: There are no prior exams. The lungs are moderately expanded  with what appears to be some localized atelectasis and pneumonia at the  left base medially and potentially related to aspiration pneumonia.  There is some associated slight elevation of the left hemidiaphragm. The  heart size is normal.     This report was finalized on 7/13/2019 11:22 AM by Dr. Arsalan Sheikh M.D.                     Pertinent Labs     Results from last 7 days   Lab Units 07/14/19  0626 07/13/19  0644 07/12/19  2330   WBC 10*3/mm3 11.62* 8.09 14.59*   HEMOGLOBIN g/dL 16.4 17.5 17.1   PLATELETS 10*3/mm3 173 188 196     Results from last 7 days   Lab Units 07/14/19  0626 07/13/19  0644   SODIUM mmol/L 136 137   POTASSIUM mmol/L 4.0 4.4   CHLORIDE mmol/L 97* 102   CO2 mmol/L 28.2 24.5   BUN mg/dL 16 15   CREATININE mg/dL 1.31* 1.01   GLUCOSE mg/dL 101* 128*   Estimated Creatinine Clearance: 62.6 mL/min (A) (by C-G formula based on SCr of 1.31 mg/dL (H)).  Results from last 7 days   Lab Units 07/14/19  0626 07/13/19  0644   ALBUMIN g/dL 3.90 4.30   BILIRUBIN mg/dL 4.2* 2.7*   ALK PHOS U/L 51 62   AST (SGOT) U/L 27 32   ALT (SGPT) U/L 25 31     Results from last 7 days   Lab Units 07/14/19  0626 07/13/19  0644   CALCIUM mg/dL 8.6 8.8    ALBUMIN g/dL 3.90 4.30       Results from last 7 days   Lab Units 07/13/19  0644 07/12/19  2132   TROPONIN T ng/mL <0.010 <0.010           Invalid input(s): LDLCALC        Test Results Pending at Discharge   None   Order Current Status    Tissue Pathology Exam In process          Discharge Details        Discharge Medications      Continue These Medications      Instructions Start Date   losartan 100 MG tablet  Commonly known as:  COZAAR   100 mg, Oral, Daily      omeprazole 20 MG capsule  Commonly known as:  priLOSEC   20 mg, Oral, Daily             No Known Allergies      Discharge Disposition:  Home or Self Care    Discharge Diet:  Diet Order   Procedures   • Diet Regular; GI Soft / Andreas       Discharge Activity:   Activity Instructions     Activity as Tolerated            CODE STATUS:    Code Status and Medical Interventions:   Ordered at: 07/13/19 0405     Code Status:    CPR     Medical Interventions (Level of Support Prior to Arrest):    Full       No future appointments.  Additional Instructions for the Follow-ups that You Need to Schedule     Discharge Follow-up with Specified Provider: Dr Carias; 6 Weeks   As directed      To:  Dr Carias    Follow Up:  6 Weeks           Follow-up Information     Provider, No Known .    Contact information:  [facility] [id number] [phone]                   Additional Instructions for the Follow-ups that You Need to Schedule     Discharge Follow-up with Specified Provider: Dr Carias; 6 Weeks   As directed      To:  Dr Carias    Follow Up:  6 Weeks           Time Spent on Discharge:  Greater than 30 minutes      Electronically signed by Ash Duong MD, 7/14/2019, 8:54 AM

## 2019-07-15 LAB
CYTO UR: NORMAL
LAB AP CASE REPORT: NORMAL
LAB AP DIAGNOSIS COMMENT: NORMAL
PATH REPORT.FINAL DX SPEC: NORMAL
PATH REPORT.GROSS SPEC: NORMAL

## 2019-07-16 DIAGNOSIS — I10 HYPERTENSION, UNSPECIFIED TYPE: ICD-10-CM

## 2019-07-16 DIAGNOSIS — R94.31 ABNORMAL EKG: Primary | ICD-10-CM

## 2019-07-16 DIAGNOSIS — R94.31 ECG ABNORMAL: Primary | ICD-10-CM

## 2019-07-22 ENCOUNTER — TELEPHONE (OUTPATIENT)
Dept: GASTROENTEROLOGY | Facility: CLINIC | Age: 69
End: 2019-07-22

## 2019-07-22 NOTE — TELEPHONE ENCOUNTER
Call to pt.  Advise per Dr mccarthy that path fraom the EGD showed De Dios's esophagus, which is a complication of chronic GERD.  Recommend repeat EGD in 3-5 yrs.  Verb understanding  States is on ppi.    EGD for 7/13/22 placed in recall.

## 2019-07-22 NOTE — TELEPHONE ENCOUNTER
----- Message from Glenn Carias MD sent at 7/21/2019  3:41 PM EDT -----  Tell him that path from the EGD showed that he has De Dios's esophagus which is a complication of chronic SUKHWINDER. I recommend that he have a repeat EGD in 3-5 yrs. Thx. becca

## 2019-07-31 ENCOUNTER — OFFICE VISIT (OUTPATIENT)
Dept: CARDIOLOGY | Age: 69
End: 2019-07-31

## 2019-07-31 VITALS
BODY MASS INDEX: 27.63 KG/M2 | DIASTOLIC BLOOD PRESSURE: 83 MMHG | WEIGHT: 193 LBS | SYSTOLIC BLOOD PRESSURE: 129 MMHG | HEIGHT: 70 IN | HEART RATE: 70 BPM

## 2019-07-31 DIAGNOSIS — I10 HYPERTENSION, UNSPECIFIED TYPE: Primary | ICD-10-CM

## 2019-07-31 PROCEDURE — 99212 OFFICE O/P EST SF 10 MIN: CPT | Performed by: INTERNAL MEDICINE

## 2019-07-31 RX ORDER — MULTIVITAMIN
1 TABLET ORAL DAILY
COMMUNITY

## 2019-07-31 RX ORDER — LOSARTAN POTASSIUM 100 MG/1
TABLET ORAL
COMMUNITY
Start: 2019-04-09 | End: 2019-07-31

## 2019-07-31 RX ORDER — LEVOTHYROXINE SODIUM 0.05 MG/1
50 TABLET ORAL DAILY
COMMUNITY
Start: 2018-04-09 | End: 2019-07-31

## 2019-08-16 ENCOUNTER — HOSPITAL ENCOUNTER (OUTPATIENT)
Dept: CARDIOLOGY | Facility: HOSPITAL | Age: 69
Discharge: HOME | End: 2019-08-16
Admitting: INTERNAL MEDICINE

## 2019-08-16 ENCOUNTER — HOSPITAL ENCOUNTER (OUTPATIENT)
Dept: NUCLEAR MEDICINE | Facility: HOSPITAL | Age: 69
Discharge: HOME | End: 2019-08-16

## 2019-08-16 ENCOUNTER — HOSPITAL ENCOUNTER (OUTPATIENT)
Dept: CARDIOLOGY | Facility: HOSPITAL | Age: 69
Discharge: HOME | End: 2019-08-16

## 2019-08-16 VITALS
DIASTOLIC BLOOD PRESSURE: 84 MMHG | WEIGHT: 191.8 LBS | BODY MASS INDEX: 27.46 KG/M2 | HEIGHT: 70 IN | SYSTOLIC BLOOD PRESSURE: 132 MMHG

## 2019-08-16 DIAGNOSIS — I10 HYPERTENSION, UNSPECIFIED TYPE: ICD-10-CM

## 2019-08-16 DIAGNOSIS — R94.31 ABNORMAL EKG: ICD-10-CM

## 2019-08-16 LAB
AORTIC DIMENSIONLESS INDEX: 0.3 (DI)
BH CV ECHO MEAS - ACS: 1.6 CM
BH CV ECHO MEAS - AO MAX PG (FULL): 15.8 MMHG
BH CV ECHO MEAS - AO MAX PG: 18.5 MMHG
BH CV ECHO MEAS - AO MEAN PG (FULL): 10 MMHG
BH CV ECHO MEAS - AO MEAN PG: 11 MMHG
BH CV ECHO MEAS - AO ROOT AREA (BSA CORRECTED): 1.8
BH CV ECHO MEAS - AO ROOT AREA: 10.8 CM^2
BH CV ECHO MEAS - AO ROOT DIAM: 3.7 CM
BH CV ECHO MEAS - AO V2 MAX: 215 CM/SEC
BH CV ECHO MEAS - AO V2 MEAN: 152 CM/SEC
BH CV ECHO MEAS - AO V2 VTI: 45.5 CM
BH CV ECHO MEAS - ASC AORTA: 4.5 CM
BH CV ECHO MEAS - AVA(I,A): 1.4 CM^2
BH CV ECHO MEAS - AVA(I,D): 1.4 CM^2
BH CV ECHO MEAS - AVA(V,A): 1.2 CM^2
BH CV ECHO MEAS - AVA(V,D): 1.2 CM^2
BH CV ECHO MEAS - BSA(HAYCOCK): 2.1 M^2
BH CV ECHO MEAS - BSA: 2 M^2
BH CV ECHO MEAS - BZI_BMI: 27.8 KILOGRAMS/M^2
BH CV ECHO MEAS - BZI_METRIC_HEIGHT: 177 CM
BH CV ECHO MEAS - BZI_METRIC_WEIGHT: 87 KG
BH CV ECHO MEAS - EDV(CUBED): 106.2 ML
BH CV ECHO MEAS - EDV(MOD-SP2): 105 ML
BH CV ECHO MEAS - EDV(MOD-SP4): 129 ML
BH CV ECHO MEAS - EDV(TEICH): 104.2 ML
BH CV ECHO MEAS - EF(CUBED): 69.7 %
BH CV ECHO MEAS - EF(MOD-BP): 70 %
BH CV ECHO MEAS - EF(MOD-SP2): 69.6 %
BH CV ECHO MEAS - EF(MOD-SP4): 66.3 %
BH CV ECHO MEAS - EF(TEICH): 61.3 %
BH CV ECHO MEAS - ESV(CUBED): 32.2 ML
BH CV ECHO MEAS - ESV(MOD-SP2): 31.9 ML
BH CV ECHO MEAS - ESV(MOD-SP4): 43.5 ML
BH CV ECHO MEAS - ESV(TEICH): 40.3 ML
BH CV ECHO MEAS - FS: 32.8 %
BH CV ECHO MEAS - IVS/LVPW: 1.1
BH CV ECHO MEAS - IVSD: 0.94 CM
BH CV ECHO MEAS - LAT PEAK E' VEL: 7.6 CM/SEC
BH CV ECHO MEAS - LV DIASTOLIC VOL/BSA (35-75): 63.1 ML/M^2
BH CV ECHO MEAS - LV MASS(C)D: 143.3 GRAMS
BH CV ECHO MEAS - LV MASS(C)DI: 70.1 GRAMS/M^2
BH CV ECHO MEAS - LV MAX PG: 2.6 MMHG
BH CV ECHO MEAS - LV MEAN PG: 1 MMHG
BH CV ECHO MEAS - LV SYSTOLIC VOL/BSA (12-30): 21.3 ML/M^2
BH CV ECHO MEAS - LV V1 MAX: 81.3 CM/SEC
BH CV ECHO MEAS - LV V1 MEAN: 55.7 CM/SEC
BH CV ECHO MEAS - LV V1 VTI: 20.2 CM
BH CV ECHO MEAS - LVIDD: 4.7 CM
BH CV ECHO MEAS - LVIDS: 3.2 CM
BH CV ECHO MEAS - LVLD AP2: 7.7 CM
BH CV ECHO MEAS - LVLD AP4: 9 CM
BH CV ECHO MEAS - LVLS AP2: 6.9 CM
BH CV ECHO MEAS - LVLS AP4: 7 CM
BH CV ECHO MEAS - LVOT AREA (M): 3.1 CM^2
BH CV ECHO MEAS - LVOT AREA: 3.1 CM^2
BH CV ECHO MEAS - LVOT DIAM: 2 CM
BH CV ECHO MEAS - LVPWD: 0.85 CM
BH CV ECHO MEAS - MED PEAK E' VEL: 6.2 CM/SEC
BH CV ECHO MEAS - MV A DUR: 169 SEC
BH CV ECHO MEAS - MV A MAX VEL: 60.6 CM/SEC
BH CV ECHO MEAS - MV DEC SLOPE: 356 CM/SEC^2
BH CV ECHO MEAS - MV DEC TIME: 222 SEC
BH CV ECHO MEAS - MV E MAX VEL: 53.9 CM/SEC
BH CV ECHO MEAS - MV E/A: 0.89
BH CV ECHO MEAS - MV MAX PG: 2.1 MMHG
BH CV ECHO MEAS - MV MEAN PG: 1 MMHG
BH CV ECHO MEAS - MV P1/2T MAX VEL: 67.9 CM/SEC
BH CV ECHO MEAS - MV P1/2T: 55.9 MSEC
BH CV ECHO MEAS - MV V2 MAX: 72.7 CM/SEC
BH CV ECHO MEAS - MV V2 MEAN: 39 CM/SEC
BH CV ECHO MEAS - MV V2 VTI: 23.7 CM
BH CV ECHO MEAS - MVA P1/2T LCG: 3.2 CM^2
BH CV ECHO MEAS - MVA(P1/2T): 3.9 CM^2
BH CV ECHO MEAS - MVA(VTI): 2.7 CM^2
BH CV ECHO MEAS - PA ACC TIME: 0.12 SEC
BH CV ECHO MEAS - PA MAX PG (FULL): 2 MMHG
BH CV ECHO MEAS - PA MAX PG: 3.2 MMHG
BH CV ECHO MEAS - PA PR(ACCEL): 26.8 MMHG
BH CV ECHO MEAS - PA V2 MAX: 89.6 CM/SEC
BH CV ECHO MEAS - PULM A REVS DUR: 0.1 SEC
BH CV ECHO MEAS - PULM A REVS VEL: 33.6 CM/SEC
BH CV ECHO MEAS - PULM DIAS VEL: 41.5 CM/SEC
BH CV ECHO MEAS - PULM S/D: 1.2
BH CV ECHO MEAS - PULM SYS VEL: 49.4 CM/SEC
BH CV ECHO MEAS - PVA(V,A): 4 CM^2
BH CV ECHO MEAS - PVA(V,D): 4 CM^2
BH CV ECHO MEAS - QP/QS: 1.1
BH CV ECHO MEAS - RAP SYSTOLE: 8 MMHG
BH CV ECHO MEAS - RV MAX PG: 1.2 MMHG
BH CV ECHO MEAS - RV MEAN PG: 1 MMHG
BH CV ECHO MEAS - RV V1 MAX: 53.9 CM/SEC
BH CV ECHO MEAS - RV V1 MEAN: 37.1 CM/SEC
BH CV ECHO MEAS - RV V1 VTI: 10.4 CM
BH CV ECHO MEAS - RVOT AREA: 6.6 CM^2
BH CV ECHO MEAS - RVOT DIAM: 2.9 CM
BH CV ECHO MEAS - RVSP: 31 MMHG
BH CV ECHO MEAS - SI(AO): 239.4 ML/M^2
BH CV ECHO MEAS - SI(CUBED): 36.2 ML/M^2
BH CV ECHO MEAS - SI(LVOT): 31 ML/M^2
BH CV ECHO MEAS - SI(MOD-SP2): 35.8 ML/M^2
BH CV ECHO MEAS - SI(MOD-SP4): 41.8 ML/M^2
BH CV ECHO MEAS - SI(TEICH): 31.2 ML/M^2
BH CV ECHO MEAS - SV(AO): 489.2 ML
BH CV ECHO MEAS - SV(CUBED): 74 ML
BH CV ECHO MEAS - SV(LVOT): 63.5 ML
BH CV ECHO MEAS - SV(MOD-SP2): 73.1 ML
BH CV ECHO MEAS - SV(MOD-SP4): 85.5 ML
BH CV ECHO MEAS - SV(RVOT): 68.7 ML
BH CV ECHO MEAS - SV(TEICH): 63.8 ML
BH CV ECHO MEAS - TAPSE (>1.6): 2.7 CM2
BH CV ECHO MEAS - TR MAX PG: 23
BH CV ECHO MEAS - TR MAX VEL: 234 CM/SEC
BH CV ECHO MEASUREMENTS AVERAGE E/E' RATIO: 7.81
BH CV XLRA - RV BASE: 4.2 CM
BH CV XLRA - TDI S': 14.5 CM/SEC
LEFT ATRIUM VOLUME INDEX: 16.9 ML/M2

## 2019-08-16 PROCEDURE — 93017 CV STRESS TEST TRACING ONLY: CPT

## 2019-08-16 PROCEDURE — 0 TECHNETIUM SESTAMIBI: Performed by: INTERNAL MEDICINE

## 2019-08-16 PROCEDURE — 78452 HT MUSCLE IMAGE SPECT MULT: CPT

## 2019-08-16 PROCEDURE — 93016 CV STRESS TEST SUPVJ ONLY: CPT | Performed by: INTERNAL MEDICINE

## 2019-08-16 PROCEDURE — 78452 HT MUSCLE IMAGE SPECT MULT: CPT | Performed by: INTERNAL MEDICINE

## 2019-08-16 PROCEDURE — A9500 TC99M SESTAMIBI: HCPCS | Performed by: INTERNAL MEDICINE

## 2019-08-16 PROCEDURE — 25010000002 PERFLUTREN (DEFINITY) 8.476 MG IN SODIUM CHLORIDE 0.9 % 10 ML INJECTION: Performed by: INTERNAL MEDICINE

## 2019-08-16 PROCEDURE — 93018 CV STRESS TEST I&R ONLY: CPT | Performed by: INTERNAL MEDICINE

## 2019-08-16 PROCEDURE — 93306 TTE W/DOPPLER COMPLETE: CPT | Performed by: INTERNAL MEDICINE

## 2019-08-16 PROCEDURE — 93306 TTE W/DOPPLER COMPLETE: CPT

## 2019-08-16 RX ADMIN — TECHNETIUM TC 99M SESTAMIBI 1 DOSE: 1 INJECTION INTRAVENOUS at 09:49

## 2019-08-16 RX ADMIN — TECHNETIUM TC 99M SESTAMIBI 1 DOSE: 1 INJECTION INTRAVENOUS at 07:50

## 2019-08-16 RX ADMIN — PERFLUTREN 2 ML: 6.52 INJECTION, SUSPENSION INTRAVENOUS at 10:30

## 2019-08-21 LAB
BH CV NUCLEAR PRIOR STUDY: 3
BH CV STRESS BP STAGE 1: NORMAL
BH CV STRESS BP STAGE 2: NORMAL
BH CV STRESS BP STAGE 3: NORMAL
BH CV STRESS DURATION MIN STAGE 1: 3
BH CV STRESS DURATION MIN STAGE 2: 3
BH CV STRESS DURATION MIN STAGE 3: 2
BH CV STRESS DURATION SEC STAGE 1: 0
BH CV STRESS DURATION SEC STAGE 2: 0
BH CV STRESS DURATION SEC STAGE 3: 19
BH CV STRESS GRADE STAGE 1: 10
BH CV STRESS GRADE STAGE 2: 12
BH CV STRESS GRADE STAGE 3: 14
BH CV STRESS HR STAGE 1: 90
BH CV STRESS HR STAGE 2: 120
BH CV STRESS HR STAGE 3: 145
BH CV STRESS METS STAGE 1: 5
BH CV STRESS METS STAGE 2: 7.5
BH CV STRESS METS STAGE 3: 10
BH CV STRESS PROTOCOL 1: NORMAL
BH CV STRESS RECOVERY BP: NORMAL MMHG
BH CV STRESS RECOVERY HR: 94 BPM
BH CV STRESS SPEED STAGE 1: 1.7
BH CV STRESS SPEED STAGE 2: 2.5
BH CV STRESS SPEED STAGE 3: 3.4
BH CV STRESS STAGE 1: 1
BH CV STRESS STAGE 2: 2
BH CV STRESS STAGE 3: 3
LV EF NUC BP: 55 %
MAXIMAL PREDICTED HEART RATE: 154 BPM
PERCENT MAX PREDICTED HR: 94.81 %
STRESS BASELINE BP: NORMAL MMHG
STRESS BASELINE HR: 58 BPM
STRESS O2 SAT REST: 98 %
STRESS PERCENT HR: 112 %
STRESS POST ESTIMATED WORKLOAD: 10.2 METS
STRESS POST EXERCISE DUR MIN: 8 MIN
STRESS POST EXERCISE DUR SEC: 20 SEC
STRESS POST PEAK BP: NORMAL MMHG
STRESS POST PEAK HR: 146 BPM
STRESS TARGET HR: 131 BPM

## 2019-09-23 ENCOUNTER — TELEPHONE (OUTPATIENT)
Dept: CARDIOLOGY | Facility: CLINIC | Age: 69
End: 2019-09-23

## 2019-10-02 ENCOUNTER — OFFICE VISIT (OUTPATIENT)
Dept: GASTROENTEROLOGY | Facility: CLINIC | Age: 69
End: 2019-10-02

## 2019-10-02 VITALS
WEIGHT: 201 LBS | HEIGHT: 70 IN | DIASTOLIC BLOOD PRESSURE: 80 MMHG | BODY MASS INDEX: 28.77 KG/M2 | TEMPERATURE: 97.8 F | SYSTOLIC BLOOD PRESSURE: 114 MMHG

## 2019-10-02 DIAGNOSIS — K22.70 BARRETT'S ESOPHAGUS WITHOUT DYSPLASIA: ICD-10-CM

## 2019-10-02 DIAGNOSIS — R13.10 DYSPHAGIA, UNSPECIFIED TYPE: ICD-10-CM

## 2019-10-02 DIAGNOSIS — K63.5 POLYP OF COLON, UNSPECIFIED PART OF COLON, UNSPECIFIED TYPE: Primary | ICD-10-CM

## 2019-10-02 PROCEDURE — 99214 OFFICE O/P EST MOD 30 MIN: CPT | Performed by: INTERNAL MEDICINE

## 2019-10-02 NOTE — PROGRESS NOTES
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  Dr. Cecil Bains had previously done an EGD with esophageal dilation about 7 years ago.  The EGD showed food in the distal third of the esophagus with a benign distal esophageal stricture which I dilated with a 12 to 15 mm through-the-scope balloon.  Pathology of the distal esophagus showed De Dios's esophagus without dysplasia. He is taking Protonix 40 mg/day. NO heartburn. No chest or abdominal pain. No nausea or vomiting. NO fevers, chills. No diarrhea or constipation. NO rectal bleeding or melena. Weight stable.     Past Medical History:   Diagnosis Date   • GERD (gastroesophageal reflux disease)    • Hypertension        Past Surgical History:   Procedure Laterality Date   • ENDOSCOPY N/A 7/13/2019    benign appearing esophageal stenosis, food in lower third of esophagus, Path De Dios's esophagus   • ESOPHAGEAL DILATATION     • HERNIA REPAIR     • UPPER GASTROINTESTINAL ENDOSCOPY  07/13/2019    De Dios's esophagus         Current Outpatient Medications:   •  GLUCOSAMINE-CHONDROITIN ER PO, Take  by mouth., Disp: , Rfl:   •  losartan (COZAAR) 100 MG tablet, Take 100 mg by mouth Daily., Disp: , Rfl:   •  Multiple Vitamin (MULTIVITAMIN) tablet, Take 1 tablet by mouth Daily., Disp: , Rfl:   •  PANTOPRAZOLE SODIUM PO, Take 40 mg by mouth., Disp: , Rfl:   •  ZN-PYG AFRI-NETTLE-SAW PALMET PO, Take 1 capsule by mouth Daily., Disp: , Rfl:     No Known Allergies    Family History   Problem Relation Age of Onset   • Heart attack Father    • Diabetes Brother        Social History     Socioeconomic History   • Marital status:      Spouse name: Not on file   • Number of children: Not on file   • Years of  education: Not on file   • Highest education level: Not on file   Tobacco Use   • Smoking status: Never Smoker   • Smokeless tobacco: Never Used   Substance and Sexual Activity   • Alcohol use: No     Frequency: Never   • Drug use: No       Review of Systems   All other systems reviewed and are negative.      Vitals:    10/02/19 1355   BP: 114/80   Temp: 97.8 °F (36.6 °C)       Physical Exam   Constitutional: He is oriented to person, place, and time. He appears well-developed and well-nourished. No distress.   HENT:   Head: Normocephalic and atraumatic. Hair is normal.   Right Ear: Hearing, tympanic membrane, external ear and ear canal normal.   Left Ear: Hearing, tympanic membrane, external ear and ear canal normal.   Nose: No sinus tenderness or nasal deformity.   Mouth/Throat: Uvula is midline, oropharynx is clear and moist and mucous membranes are normal. No oral lesions. No uvula swelling.   Eyes: Conjunctivae, EOM and lids are normal. Pupils are equal, round, and reactive to light. Right eye exhibits no discharge. Left eye exhibits no discharge. No scleral icterus. Right eye exhibits normal extraocular motion and no nystagmus. Left eye exhibits normal extraocular motion and no nystagmus.   Neck: Normal range of motion. Neck supple. No JVD present. No thyromegaly present.   Cardiovascular: Normal rate, regular rhythm, normal heart sounds, intact distal pulses and normal pulses. Exam reveals no gallop.   No murmur heard.  Pulmonary/Chest: Effort normal and breath sounds normal. No respiratory distress. He has no wheezes. He has no rales. He exhibits no tenderness.   Abdominal: Soft. Bowel sounds are normal. He exhibits no distension and no mass. There is no tenderness. There is no guarding. No hernia.   Musculoskeletal: Normal range of motion. He exhibits no edema, tenderness or deformity.   Lymphadenopathy:     He has no cervical adenopathy.   Neurological: He is alert and oriented to person, place, and time.  He has normal reflexes. He displays normal reflexes. No cranial nerve deficit. He exhibits normal muscle tone. Coordination normal.   Skin: Skin is warm and dry. No rash noted. He is not diaphoretic.   Psychiatric: He has a normal mood and affect. His behavior is normal. Judgment and thought content normal.   Vitals reviewed.      Fabio was seen today for follow-up and difficulty swallowing.    Diagnoses and all orders for this visit:    Polyp of colon, unspecified part of colon, unspecified type  -     Case Request; Standing  -     Case Request    Dysphagia, unspecified type    De Dios's esophagus without dysplasia    Other orders  -     Follow Anesthesia Guidelines / Standing Orders; Future  -     Obtain Informed Consent; Future  -     Implement Anesthesia orders day of procedure.; Standing  -     Obtain informed consent; Standing  -     Verify bowel prep was successful; Standing  -     Give tap water enema if bowel prep was insufficient; Standing      Assessment:  1.  History of dysphasia intermittently.  2.  Pathology from his 7 of 2019 EGD was consistent with De Dios's esophagus without dysplasia.  3.  History of colon polyps.  Patient is due for another colonoscopy.  4.    Recommendations:  1. Colonoscopy  2) Repeat EGD in 3-5 yrs.     No Follow-up on file.    Glenn Carias MD  10/2/2019

## 2019-11-21 ENCOUNTER — ANESTHESIA (OUTPATIENT)
Dept: GASTROENTEROLOGY | Facility: HOSPITAL | Age: 69
End: 2019-11-21

## 2019-11-21 ENCOUNTER — HOSPITAL ENCOUNTER (OUTPATIENT)
Facility: HOSPITAL | Age: 69
Setting detail: HOSPITAL OUTPATIENT SURGERY
Discharge: HOME | End: 2019-11-21
Attending: INTERNAL MEDICINE | Admitting: INTERNAL MEDICINE

## 2019-11-21 ENCOUNTER — ANESTHESIA EVENT (OUTPATIENT)
Dept: GASTROENTEROLOGY | Facility: HOSPITAL | Age: 69
End: 2019-11-21

## 2019-11-21 VITALS
WEIGHT: 202 LBS | BODY MASS INDEX: 28.92 KG/M2 | RESPIRATION RATE: 20 BRPM | DIASTOLIC BLOOD PRESSURE: 94 MMHG | HEART RATE: 69 BPM | HEIGHT: 70 IN | SYSTOLIC BLOOD PRESSURE: 124 MMHG | OXYGEN SATURATION: 98 %

## 2019-11-21 LAB
ALBUMIN SERPL-MCNC: 4.2 G/DL (ref 3.5–5.2)
ALBUMIN/GLOB SERPL: 1.3 G/DL
ALP SERPL-CCNC: 64 U/L (ref 39–117)
ALT SERPL W P-5'-P-CCNC: 40 U/L (ref 1–41)
ANION GAP SERPL CALCULATED.3IONS-SCNC: 11.6 MMOL/L (ref 5–15)
AST SERPL-CCNC: 33 U/L (ref 1–40)
BASOPHILS # BLD AUTO: 0.05 10*3/MM3 (ref 0–0.2)
BASOPHILS NFR BLD AUTO: 0.8 % (ref 0–1.5)
BILIRUB SERPL-MCNC: 3.1 MG/DL (ref 0.2–1.2)
BUN BLD-MCNC: 12 MG/DL (ref 8–23)
BUN/CREAT SERPL: 11.9 (ref 7–25)
CALCIUM SPEC-SCNC: 8.8 MG/DL (ref 8.6–10.5)
CHLORIDE SERPL-SCNC: 103 MMOL/L (ref 98–107)
CO2 SERPL-SCNC: 24.4 MMOL/L (ref 22–29)
CREAT BLD-MCNC: 1.01 MG/DL (ref 0.76–1.27)
DEPRECATED RDW RBC AUTO: 44.8 FL (ref 37–54)
EOSINOPHIL # BLD AUTO: 0.08 10*3/MM3 (ref 0–0.4)
EOSINOPHIL NFR BLD AUTO: 1.3 % (ref 0.3–6.2)
ERYTHROCYTE [DISTWIDTH] IN BLOOD BY AUTOMATED COUNT: 13.1 % (ref 12.3–15.4)
GFR SERPL CREATININE-BSD FRML MDRD: 74 ML/MIN/1.73
GLOBULIN UR ELPH-MCNC: 3.2 GM/DL
GLUCOSE BLD-MCNC: 100 MG/DL (ref 65–99)
HCT VFR BLD AUTO: 51 % (ref 37.5–51)
HGB BLD-MCNC: 17.3 G/DL (ref 13–17.7)
IMM GRANULOCYTES # BLD AUTO: 0.01 10*3/MM3 (ref 0–0.05)
IMM GRANULOCYTES NFR BLD AUTO: 0.2 % (ref 0–0.5)
LYMPHOCYTES # BLD AUTO: 1.44 10*3/MM3 (ref 0.7–3.1)
LYMPHOCYTES NFR BLD AUTO: 23.4 % (ref 19.6–45.3)
MCH RBC QN AUTO: 31.3 PG (ref 26.6–33)
MCHC RBC AUTO-ENTMCNC: 33.9 G/DL (ref 31.5–35.7)
MCV RBC AUTO: 92.4 FL (ref 79–97)
MONOCYTES # BLD AUTO: 0.62 10*3/MM3 (ref 0.1–0.9)
MONOCYTES NFR BLD AUTO: 10.1 % (ref 5–12)
NEUTROPHILS # BLD AUTO: 3.96 10*3/MM3 (ref 1.7–7)
NEUTROPHILS NFR BLD AUTO: 64.2 % (ref 42.7–76)
NRBC BLD AUTO-RTO: 0 /100 WBC (ref 0–0.2)
PLATELET # BLD AUTO: 182 10*3/MM3 (ref 140–450)
PMV BLD AUTO: 9.4 FL (ref 6–12)
POTASSIUM BLD-SCNC: 4.4 MMOL/L (ref 3.5–5.2)
PROT SERPL-MCNC: 7.4 G/DL (ref 6–8.5)
RBC # BLD AUTO: 5.52 10*6/MM3 (ref 4.14–5.8)
SODIUM BLD-SCNC: 139 MMOL/L (ref 136–145)
WBC NRBC COR # BLD: 6.16 10*3/MM3 (ref 3.4–10.8)

## 2019-11-21 PROCEDURE — 25010000002 PROPOFOL 10 MG/ML EMULSION: Performed by: ANESTHESIOLOGY

## 2019-11-21 PROCEDURE — G0105 COLORECTAL SCRN; HI RISK IND: HCPCS | Performed by: INTERNAL MEDICINE

## 2019-11-21 PROCEDURE — 80053 COMPREHEN METABOLIC PANEL: CPT | Performed by: INTERNAL MEDICINE

## 2019-11-21 PROCEDURE — 85025 COMPLETE CBC W/AUTO DIFF WBC: CPT | Performed by: INTERNAL MEDICINE

## 2019-11-21 RX ORDER — SODIUM CHLORIDE 0.9 % (FLUSH) 0.9 %
10 SYRINGE (ML) INJECTION AS NEEDED
Status: DISCONTINUED | OUTPATIENT
Start: 2019-11-21 | End: 2019-11-21 | Stop reason: HOSPADM

## 2019-11-21 RX ORDER — LIDOCAINE HYDROCHLORIDE 10 MG/ML
0.5 INJECTION, SOLUTION INFILTRATION; PERINEURAL ONCE AS NEEDED
Status: DISCONTINUED | OUTPATIENT
Start: 2019-11-21 | End: 2019-11-21 | Stop reason: HOSPADM

## 2019-11-21 RX ORDER — SODIUM CHLORIDE, SODIUM LACTATE, POTASSIUM CHLORIDE, CALCIUM CHLORIDE 600; 310; 30; 20 MG/100ML; MG/100ML; MG/100ML; MG/100ML
1000 INJECTION, SOLUTION INTRAVENOUS CONTINUOUS
Status: DISCONTINUED | OUTPATIENT
Start: 2019-11-21 | End: 2019-11-21 | Stop reason: HOSPADM

## 2019-11-21 RX ORDER — LIDOCAINE HYDROCHLORIDE 20 MG/ML
INJECTION, SOLUTION INFILTRATION; PERINEURAL AS NEEDED
Status: DISCONTINUED | OUTPATIENT
Start: 2019-11-21 | End: 2019-11-21 | Stop reason: SURG

## 2019-11-21 RX ORDER — PROPOFOL 10 MG/ML
VIAL (ML) INTRAVENOUS CONTINUOUS PRN
Status: DISCONTINUED | OUTPATIENT
Start: 2019-11-21 | End: 2019-11-21 | Stop reason: SURG

## 2019-11-21 RX ADMIN — PROPOFOL 150 MCG/KG/MIN: 10 INJECTION, EMULSION INTRAVENOUS at 14:40

## 2019-11-21 RX ADMIN — ALFENTANIL HYDROCHLORIDE 500 MCG: 500 INJECTION, SOLUTION INTRAVENOUS at 14:40

## 2019-11-21 RX ADMIN — LIDOCAINE HYDROCHLORIDE 40 MG: 20 INJECTION, SOLUTION INFILTRATION; PERINEURAL at 14:40

## 2019-11-21 RX ADMIN — SODIUM CHLORIDE, POTASSIUM CHLORIDE, SODIUM LACTATE AND CALCIUM CHLORIDE: 600; 310; 30; 20 INJECTION, SOLUTION INTRAVENOUS at 13:32

## 2019-11-21 NOTE — ANESTHESIA POSTPROCEDURE EVALUATION
"Patient: Fabio Zhang    Procedure Summary     Date:  11/21/19 Room / Location:  [facility] ENDOSCOPY 5 / [facility] ENDOSCOPY    Anesthesia Start:  1434 Anesthesia Stop:  1500    Procedure:  COLONOSCOPY TO CECUM AND TI (N/A ) Diagnosis:       Polyp of colon, unspecified part of colon, unspecified type      (Polyp of colon, unspecified part of colon, unspecified type [K63.5])    Surgeon:  Glenn Carias MD Provider:  Harmeet Ames MD    Anesthesia Type:  MAC ASA Status:  2          Anesthesia Type: MAC  Last vitals  BP   112/71 (11/21/19 1458)   Temp       Pulse   70 (11/21/19 1458)   Resp   20 (11/21/19 1458)     SpO2   92 % (11/21/19 1458)     Post Anesthesia Care and Evaluation    Patient location during evaluation: bedside  Patient participation: complete - patient participated  Level of consciousness: awake and alert  Pain management: adequate  Airway patency: patent  Anesthetic complications: No anesthetic complications    Cardiovascular status: acceptable  Respiratory status: acceptable  Hydration status: acceptable    Comments: /71 (BP Location: Left arm, Patient Position: Lying)   Pulse 70   Resp 20   Ht 177.8 cm (70\")   Wt 91.6 kg (202 lb)   SpO2 92%   BMI 28.98 kg/m²           "

## 2019-11-25 ENCOUNTER — TELEPHONE (OUTPATIENT)
Dept: GASTROENTEROLOGY | Facility: CLINIC | Age: 69
End: 2019-11-25

## 2019-11-25 NOTE — TELEPHONE ENCOUNTER
----- Message from Glenn Carias MD sent at 11/24/2019  1:58 PM EST -----  11/24/19  Tell him that his labs look good and unchanged. His total bilirubin is mildly elevated but it has been elevated in the past. Please send these lab results to his PCP. [email or web address]  Thx. kj      --Called pt and advised of the above.  Pt verb understanding.   Copy of labs sent to Dr Barton thru [facility].   Lillie Jacob RN

## 2020-07-28 RX ORDER — OMEPRAZOLE 40 MG/1
CAPSULE, DELAYED RELEASE ORAL
Qty: 30 CAPSULE | Refills: 3 | Status: SHIPPED | OUTPATIENT
Start: 2020-07-28 | End: 2020-11-23 | Stop reason: SDUPTHER

## 2020-11-23 ENCOUNTER — OFFICE VISIT (OUTPATIENT)
Dept: GASTROENTEROLOGY | Facility: CLINIC | Age: 70
End: 2020-11-23

## 2020-11-23 VITALS — HEIGHT: 70 IN | WEIGHT: 202.8 LBS | TEMPERATURE: 97 F | BODY MASS INDEX: 29.03 KG/M2

## 2020-11-23 DIAGNOSIS — K21.9 GASTROESOPHAGEAL REFLUX DISEASE, UNSPECIFIED WHETHER ESOPHAGITIS PRESENT: ICD-10-CM

## 2020-11-23 DIAGNOSIS — K63.5 POLYP OF COLON, UNSPECIFIED PART OF COLON, UNSPECIFIED TYPE: ICD-10-CM

## 2020-11-23 DIAGNOSIS — K22.70 BARRETT'S ESOPHAGUS WITHOUT DYSPLASIA: Primary | ICD-10-CM

## 2020-11-23 LAB
BASOPHILS # BLD AUTO: 0.07 10*3/MM3 (ref 0–0.2)
BASOPHILS NFR BLD AUTO: 1 % (ref 0–1.5)
EOSINOPHIL # BLD AUTO: 0.16 10*3/MM3 (ref 0–0.4)
EOSINOPHIL NFR BLD AUTO: 2.2 % (ref 0.3–6.2)
ERYTHROCYTE [DISTWIDTH] IN BLOOD BY AUTOMATED COUNT: 12.5 % (ref 12.3–15.4)
HCT VFR BLD AUTO: 52.2 % (ref 37.5–51)
HGB BLD-MCNC: 18.1 G/DL (ref 13–17.7)
IMM GRANULOCYTES # BLD AUTO: 0.03 10*3/MM3 (ref 0–0.05)
IMM GRANULOCYTES NFR BLD AUTO: 0.4 % (ref 0–0.5)
LYMPHOCYTES # BLD AUTO: 1.72 10*3/MM3 (ref 0.7–3.1)
LYMPHOCYTES NFR BLD AUTO: 23.4 % (ref 19.6–45.3)
MCH RBC QN AUTO: 32 PG (ref 26.6–33)
MCHC RBC AUTO-ENTMCNC: 34.7 G/DL (ref 31.5–35.7)
MCV RBC AUTO: 92.4 FL (ref 79–97)
MONOCYTES # BLD AUTO: 0.92 10*3/MM3 (ref 0.1–0.9)
MONOCYTES NFR BLD AUTO: 12.5 % (ref 5–12)
NEUTROPHILS # BLD AUTO: 4.45 10*3/MM3 (ref 1.7–7)
NEUTROPHILS NFR BLD AUTO: 60.5 % (ref 42.7–76)
NRBC BLD AUTO-RTO: 0 /100 WBC (ref 0–0.2)
PLATELET # BLD AUTO: 192 10*3/MM3 (ref 140–450)
RBC # BLD AUTO: 5.65 10*6/MM3 (ref 4.14–5.8)
WBC # BLD AUTO: 7.35 10*3/MM3 (ref 3.4–10.8)

## 2020-11-23 PROCEDURE — 99213 OFFICE O/P EST LOW 20 MIN: CPT | Performed by: INTERNAL MEDICINE

## 2020-11-23 RX ORDER — INFLUENZA A VIRUS A/MICHIGAN/45/2015 X-275 (H1N1) ANTIGEN (FORMALDEHYDE INACTIVATED), INFLUENZA A VIRUS A/SINGAPORE/INFIMH-16-0019/2016 IVR-186 (H3N2) ANTIGEN (FORMALDEHYDE INACTIVATED), INFLUENZA B VIRUS B/PHUKET/3073/2013 ANTIGEN (FORMALDEHYDE INACTIVATED), AND INFLUENZA B VIRUS B/MARYLAND/15/2016 BX-69A ANTIGEN (FORMALDEHYDE INACTIVATED) 60; 60; 60; 60 UG/.7ML; UG/.7ML; UG/.7ML; UG/.7ML
INJECTION, SUSPENSION INTRAMUSCULAR
COMMUNITY
Start: 2020-09-28 | End: 2020-11-23

## 2020-11-23 RX ORDER — OMEPRAZOLE 40 MG/1
40 CAPSULE, DELAYED RELEASE ORAL DAILY
Qty: 90 CAPSULE | Refills: 3 | Status: SHIPPED | OUTPATIENT
Start: 2020-11-23 | End: 2021-11-16

## 2020-11-23 NOTE — PROGRESS NOTES
Chief Complaint   Patient presents with   • Follow-up     Annual   • Difficulty Swallowing       History of Present Illness:   70 y.o. male who I met in the emergency room at [facility] on 7/13/2019 when he ate some steak that became stuck in his esophagus.  Dr. Cecil Bains had previously done an EGD with esophageal dilation about 7 years ago.  The EGD showed food in the distal third of the esophagus with a benign distal esophageal stricture which I dilated with a 12 to 15 mm through-the-scope balloon.  Pathology of the distal esophagus showed De Dios's esophagus without dysplasia.         He also has a history of colon polyps.  His last colonoscopy was in 11 of 2019.  At that time I had recommended that he have a repeat colonoscopy in 5 years.       NO dysphagia. No heartburn. No chest or abdominal pain. No nausea or vomiting. No fervers, chills. No rectal bleeding or melena. No diarrhea or constipation. Weight about the same.     Past Medical History:   Diagnosis Date   • GERD (gastroesophageal reflux disease)    • Hypertension        Past Surgical History:   Procedure Laterality Date   • COLONOSCOPY N/A 11/21/2019    Procedure: COLONOSCOPY TO CECUM AND TI;  Surgeon: Glenn Carias MD;  Location: [facility];  Service: Gastroenterology   • ENDOSCOPY N/A 7/13/2019    benign appearing esophageal stenosis, food in lower third of esophagus, Path De Dios's esophagus   • ESOPHAGEAL DILATATION     • HERNIA REPAIR     • UPPER GASTROINTESTINAL ENDOSCOPY  07/13/2019    De Dios's esophagus         Current Outpatient Medications:   •  GLUCOSAMINE-CHONDROITIN ER PO, Take  by mouth., Disp: , Rfl:   •  losartan (COZAAR) 100 MG tablet, Take 100 mg by mouth Daily., Disp: , Rfl:   •  Multiple Vitamin (MULTIVITAMIN) tablet, Take 1 tablet by mouth Daily., Disp: , Rfl:   •  omeprazole (priLOSEC) 40 MG capsule, Take 1 capsule by mouth Daily., Disp: 90 capsule, Rfl: 3  •  ZN-PYG AFRI-NETTLE-SAW PALMET PO, Take 1  capsule by mouth Daily., Disp: , Rfl:     No Known Allergies    Family History   Problem Relation Age of Onset   • Heart attack Father    • Diabetes Brother        Social History     Socioeconomic History   • Marital status:      Spouse name: Not on file   • Number of children: Not on file   • Years of education: Not on file   • Highest education level: Not on file   Tobacco Use   • Smoking status: Never Smoker   • Smokeless tobacco: Never Used   Substance and Sexual Activity   • Alcohol use: No     Frequency: Never   • Drug use: No       Review of Systems   Gastrointestinal: Negative for abdominal pain.     Pertinent positives and negatives documented in the HPI and all other systems reviewed and were found to be negative.  Vitals:    11/23/20 1002   Temp: 97 °F (36.1 °C)       Physical Exam  Vitals signs reviewed.   Constitutional:       General: He is not in acute distress.     Appearance: He is well-developed. He is not diaphoretic.   HENT:      Head: Normocephalic and atraumatic. Hair is normal.      Right Ear: Hearing, tympanic membrane, ear canal and external ear normal.      Left Ear: Hearing, tympanic membrane, ear canal and external ear normal.      Nose: No nasal deformity.      Mouth/Throat:      Mouth: No oral lesions.      Pharynx: Uvula midline. No uvula swelling.   Eyes:      General: Lids are normal. No scleral icterus.        Right eye: No discharge.         Left eye: No discharge.      Extraocular Movements:      Right eye: Normal extraocular motion and no nystagmus.      Left eye: Normal extraocular motion and no nystagmus.      Conjunctiva/sclera: Conjunctivae normal.      Pupils: Pupils are equal, round, and reactive to light.   Neck:      Musculoskeletal: Normal range of motion and neck supple.      Thyroid: No thyromegaly.      Vascular: No JVD.   Cardiovascular:      Rate and Rhythm: Normal rate and regular rhythm.      Pulses: Normal pulses.      Heart sounds: Normal heart sounds. No  murmur. No gallop.    Pulmonary:      Effort: Pulmonary effort is normal. No respiratory distress.      Breath sounds: Normal breath sounds. No wheezing or rales.   Chest:      Chest wall: No tenderness.   Abdominal:      General: Bowel sounds are normal. There is no distension.      Palpations: Abdomen is soft. There is no mass.      Tenderness: There is no abdominal tenderness. There is no guarding.      Hernia: No hernia is present.   Musculoskeletal: Normal range of motion.         General: No tenderness or deformity.   Lymphadenopathy:      Cervical: No cervical adenopathy.   Skin:     General: Skin is warm and dry.      Findings: No rash.   Neurological:      Mental Status: He is alert and oriented to person, place, and time.      Cranial Nerves: No cranial nerve deficit.      Motor: No abnormal muscle tone.      Coordination: Coordination normal.      Deep Tendon Reflexes: Reflexes are normal and symmetric. Reflexes normal.   Psychiatric:         Behavior: Behavior normal.         Thought Content: Thought content normal.         Judgment: Judgment normal.         Diagnoses and all orders for this visit:    1. De Dios's esophagus without dysplasia (Primary)  -     omeprazole (priLOSEC) 40 MG capsule; Take 1 capsule by mouth Daily.  Dispense: 90 capsule; Refill: 3  -     Comprehensive Metabolic Panel  -     CBC & Differential    2. Gastroesophageal reflux disease, unspecified whether esophagitis present  -     omeprazole (priLOSEC) 40 MG capsule; Take 1 capsule by mouth Daily.  Dispense: 90 capsule; Refill: 3  -     Comprehensive Metabolic Panel  -     CBC & Differential    3. Polyp of colon, unspecified part of colon, unspecified type  -     Comprehensive Metabolic Panel  -     CBC & Differential      Assessment:  1.  History of colon polyps.  2.  History of De Dios's esophagus.    Recommendations:  1.  Repeat colonoscopy in 11 of 2024.  2. Labs  3. F/u one year.     No follow-ups on file.    Glenn Carias  MD  11/23/2020

## 2020-11-24 ENCOUNTER — TELEPHONE (OUTPATIENT)
Dept: GASTROENTEROLOGY | Facility: CLINIC | Age: 70
End: 2020-11-24

## 2020-11-24 LAB
ALBUMIN SERPL-MCNC: 4.5 G/DL (ref 3.5–5.2)
ALBUMIN/GLOB SERPL: 1.6 G/DL
ALP SERPL-CCNC: 87 U/L (ref 39–117)
ALT SERPL-CCNC: 29 U/L (ref 1–41)
AST SERPL-CCNC: 28 U/L (ref 1–40)
BILIRUB SERPL-MCNC: 1.1 MG/DL (ref 0–1.2)
BUN SERPL-MCNC: 16 MG/DL (ref 8–23)
BUN/CREAT SERPL: 14.5 (ref 7–25)
CALCIUM SERPL-MCNC: 9.2 MG/DL (ref 8.6–10.5)
CHLORIDE SERPL-SCNC: 98 MMOL/L (ref 98–107)
CO2 SERPL-SCNC: 28 MMOL/L (ref 22–29)
CREAT SERPL-MCNC: 1.1 MG/DL (ref 0.76–1.27)
GLOBULIN SER CALC-MCNC: 2.9 GM/DL
GLUCOSE SERPL-MCNC: 81 MG/DL (ref 65–99)
POTASSIUM SERPL-SCNC: 4.3 MMOL/L (ref 3.5–5.2)
PROT SERPL-MCNC: 7.4 G/DL (ref 6–8.5)
SODIUM SERPL-SCNC: 135 MMOL/L (ref 136–145)

## 2020-11-24 NOTE — TELEPHONE ENCOUNTER
----- Message from Glenn Carias MD sent at 11/24/2020  2:03 PM EST -----  11/24/20  Tell him that his lab work looks okay and stable.  Please fax a copy of this report to his PCP.  Luis hudson

## 2020-11-24 NOTE — TELEPHONE ENCOUNTER
Call to pt.  Advise per DR Carias note.  Verb understanding.     Labs faxed via epic to Dr Radha Barton.

## 2020-11-24 NOTE — PROGRESS NOTES
11/24/20  Tell him that his lab work looks okay and stable.  Please fax a copy of this report to his PCP.  Luis hudson

## 2021-11-15 DIAGNOSIS — K21.9 GASTROESOPHAGEAL REFLUX DISEASE, UNSPECIFIED WHETHER ESOPHAGITIS PRESENT: ICD-10-CM

## 2021-11-15 DIAGNOSIS — K22.70 BARRETT'S ESOPHAGUS WITHOUT DYSPLASIA: ICD-10-CM

## 2021-11-16 RX ORDER — OMEPRAZOLE 40 MG/1
CAPSULE, DELAYED RELEASE ORAL
Qty: 90 CAPSULE | Refills: 0 | Status: SHIPPED | OUTPATIENT
Start: 2021-11-16 | End: 2022-02-25 | Stop reason: SDUPTHER

## 2022-02-16 DIAGNOSIS — K21.9 GASTROESOPHAGEAL REFLUX DISEASE, UNSPECIFIED WHETHER ESOPHAGITIS PRESENT: ICD-10-CM

## 2022-02-16 DIAGNOSIS — K22.70 BARRETT'S ESOPHAGUS WITHOUT DYSPLASIA: ICD-10-CM

## 2022-02-16 RX ORDER — OMEPRAZOLE 40 MG/1
CAPSULE, DELAYED RELEASE ORAL
Qty: 90 CAPSULE | Refills: 0 | OUTPATIENT
Start: 2022-02-16

## 2022-02-18 DIAGNOSIS — K21.9 GASTROESOPHAGEAL REFLUX DISEASE, UNSPECIFIED WHETHER ESOPHAGITIS PRESENT: ICD-10-CM

## 2022-02-18 DIAGNOSIS — K22.70 BARRETT'S ESOPHAGUS WITHOUT DYSPLASIA: ICD-10-CM

## 2022-02-21 RX ORDER — OMEPRAZOLE 40 MG/1
CAPSULE, DELAYED RELEASE ORAL
Qty: 90 CAPSULE | Refills: 0 | OUTPATIENT
Start: 2022-02-21

## 2022-02-22 DIAGNOSIS — K21.9 GASTROESOPHAGEAL REFLUX DISEASE, UNSPECIFIED WHETHER ESOPHAGITIS PRESENT: ICD-10-CM

## 2022-02-22 DIAGNOSIS — K22.70 BARRETT'S ESOPHAGUS WITHOUT DYSPLASIA: ICD-10-CM

## 2022-02-22 RX ORDER — OMEPRAZOLE 40 MG/1
CAPSULE, DELAYED RELEASE ORAL
Qty: 90 CAPSULE | Refills: 0 | OUTPATIENT
Start: 2022-02-22

## 2022-02-25 ENCOUNTER — OFFICE VISIT (OUTPATIENT)
Dept: GASTROENTEROLOGY | Facility: CLINIC | Age: 72
End: 2022-02-25

## 2022-02-25 VITALS — WEIGHT: 209 LBS | BODY MASS INDEX: 29.92 KG/M2 | TEMPERATURE: 96.9 F | HEIGHT: 70 IN

## 2022-02-25 DIAGNOSIS — R13.10 DYSPHAGIA, UNSPECIFIED TYPE: Primary | ICD-10-CM

## 2022-02-25 DIAGNOSIS — K21.9 GASTROESOPHAGEAL REFLUX DISEASE WITHOUT ESOPHAGITIS: ICD-10-CM

## 2022-02-25 DIAGNOSIS — Z86.010 HISTORY OF COLON POLYPS: ICD-10-CM

## 2022-02-25 DIAGNOSIS — K22.2 ESOPHAGEAL STENOSIS: ICD-10-CM

## 2022-02-25 DIAGNOSIS — Z87.19 HISTORY OF BARRETT'S ESOPHAGUS: ICD-10-CM

## 2022-02-25 PROCEDURE — 99214 OFFICE O/P EST MOD 30 MIN: CPT | Performed by: NURSE PRACTITIONER

## 2022-02-25 RX ORDER — OMEPRAZOLE 40 MG/1
40 CAPSULE, DELAYED RELEASE ORAL DAILY
Qty: 90 CAPSULE | Refills: 3 | Status: SHIPPED | OUTPATIENT
Start: 2022-02-25 | End: 2023-03-06 | Stop reason: SDUPTHER

## 2022-02-25 NOTE — PROGRESS NOTES
Chief Complaint   Patient presents with   • Follow-up     De Dios's esophagus       Fabio Zhang is a  71 y.o. male here for a follow up visit for GERD.    HPI  71-year-old male presents today for follow-up visit for GERD.  He is a patient of Dr. Carias.  He was last seen in the office by him on 11/23/2020.  He is new to me today.  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  He tells me as long as he members take his omeprazole he does not have any issues swallowing.  He tells me that he uses peanut butter as his litmus test and if he can swallow that well he knows he is doing really well.  His last EGD was on 7/2019.  His last colonoscopy was on 11/2019.  He does have a history of colon polyps.  Next screening colonoscopy will be due on 11/2024.  He denies any dysphagia, reflux, abdominal pain, nausea and vomiting, diarrhea, constipation, rectal bleeding or melena.  He admits his appetite is good and his weight is stable.  He denies any significant GI family history at this time.  Past Medical History:   Diagnosis Date   • GERD (gastroesophageal reflux disease)    • Hypertension        Past Surgical History:   Procedure Laterality Date   • COLONOSCOPY N/A 11/21/2019    Procedure: COLONOSCOPY TO CECUM AND TI;  Surgeon: Glenn Carias MD;  Location: [facility];  Service: Gastroenterology   • ENDOSCOPY N/A 7/13/2019    benign appearing esophageal stenosis, food in lower third of esophagus, Path De Dios's esophagus   • ESOPHAGEAL DILATATION     • HERNIA REPAIR     • UPPER GASTROINTESTINAL ENDOSCOPY  07/13/2019    De Dios's esophagus       Scheduled Meds:    Continuous Infusions:No current facility-administered medications for this visit.      PRN Meds:.    No Known Allergies    Social History     Socioeconomic History   • Marital status:    Tobacco Use   • Smoking status: Never Smoker   • Smokeless tobacco:  Never Used   Substance and Sexual Activity   • Alcohol use: No   • Drug use: No       Family History   Problem Relation Age of Onset   • Heart attack Father    • Diabetes Brother        Review of Systems   Constitutional: Negative for appetite change, chills, diaphoresis, fatigue, fever and unexpected weight change.   HENT: Negative for nosebleeds, postnasal drip, sore throat, trouble swallowing and voice change.    Respiratory: Negative for cough, choking, chest tightness, shortness of breath, wheezing and stridor.    Cardiovascular: Negative for chest pain, palpitations and leg swelling.   Gastrointestinal: Negative for abdominal distention, abdominal pain, anal bleeding, blood in stool, constipation, diarrhea, nausea, rectal pain and vomiting.   Endocrine: Negative for polydipsia, polyphagia and polyuria.   Musculoskeletal: Negative for gait problem.   Skin: Negative for rash and wound.   Allergic/Immunologic: Negative for food allergies.   Neurological: Negative for dizziness, speech difficulty and light-headedness.   Psychiatric/Behavioral: Negative for confusion, self-injury, sleep disturbance and suicidal ideas.       Vitals:    02/25/22 0955   Temp: 96.9 °F (36.1 °C)       Physical Exam  Constitutional:       General: He is not in acute distress.     Appearance: He is well-developed. He is not ill-appearing.   HENT:      Head: Normocephalic.   Eyes:      Pupils: Pupils are equal, round, and reactive to light.   Cardiovascular:      Heart sounds: Normal heart sounds.   Pulmonary:      Effort: Pulmonary effort is normal.      Breath sounds: Normal breath sounds.   Abdominal:      General: Bowel sounds are normal. There is no distension.      Palpations: Abdomen is soft. There is no mass.      Tenderness: There is no abdominal tenderness. There is no guarding or rebound.      Hernia: No hernia is present.   Musculoskeletal:         General: Normal range of motion.   Skin:     General: Skin is warm and dry.    Neurological:      Mental Status: He is alert and oriented to person, place, and time.   Psychiatric:         Speech: Speech normal.         Behavior: Behavior normal.         Judgment: Judgment normal.         No radiology results for the last 7 days     Diagnoses and all orders for this visit:    1. Dysphagia, unspecified type (Primary)  Overview:  Added automatically from request for surgery [id number]      2. Gastroesophageal reflux disease without esophagitis    3. History of De Dios's esophagus    4. Esophageal stenosis    5. History of colon polyps    Reviewed most recent EGD and colonoscopy results with him today.  GERD seems well controlled on omeprazole 40 mg daily.  Continue GERD precautions.  He denies any trouble swallowing at this time.  He tells me his pain is better as his ligament test and so far he has been swallowing that really well.  He reports his bowels are moving well.  Next screening colonoscopy and EGD will be due in 11/2024.  Will refill the omeprazole for 1 year.  Patient to call the office with any issues.  Patient to follow-up with me in 1 year.  Patient is agreeable to the plan.

## 2023-02-27 DIAGNOSIS — K21.9 GASTROESOPHAGEAL REFLUX DISEASE WITHOUT ESOPHAGITIS: ICD-10-CM

## 2023-02-27 RX ORDER — OMEPRAZOLE 40 MG/1
CAPSULE, DELAYED RELEASE ORAL
Qty: 90 CAPSULE | Refills: 3 | OUTPATIENT
Start: 2023-02-27

## 2023-03-06 ENCOUNTER — OFFICE VISIT (OUTPATIENT)
Dept: GASTROENTEROLOGY | Facility: CLINIC | Age: 73
End: 2023-03-06
Payer: MEDICARE

## 2023-03-06 VITALS
BODY MASS INDEX: 31.12 KG/M2 | HEART RATE: 88 BPM | OXYGEN SATURATION: 96 % | DIASTOLIC BLOOD PRESSURE: 89 MMHG | HEIGHT: 70 IN | SYSTOLIC BLOOD PRESSURE: 144 MMHG | WEIGHT: 217.4 LBS | TEMPERATURE: 96.8 F

## 2023-03-06 DIAGNOSIS — Z86.010 HISTORY OF COLON POLYPS: ICD-10-CM

## 2023-03-06 DIAGNOSIS — Z87.19 HISTORY OF BARRETT'S ESOPHAGUS: Primary | ICD-10-CM

## 2023-03-06 DIAGNOSIS — K22.2 ESOPHAGEAL STENOSIS: ICD-10-CM

## 2023-03-06 DIAGNOSIS — K21.9 GASTROESOPHAGEAL REFLUX DISEASE WITHOUT ESOPHAGITIS: ICD-10-CM

## 2023-03-06 PROCEDURE — 99213 OFFICE O/P EST LOW 20 MIN: CPT | Performed by: NURSE PRACTITIONER

## 2023-03-06 RX ORDER — MELATONIN
1000 DAILY
COMMUNITY

## 2023-03-06 RX ORDER — OMEPRAZOLE 40 MG/1
40 CAPSULE, DELAYED RELEASE ORAL DAILY
Qty: 90 CAPSULE | Refills: 3 | Status: SHIPPED | OUTPATIENT
Start: 2023-03-06

## 2023-03-06 NOTE — PROGRESS NOTES
Chief Complaint   Patient presents with   • Heartburn     HX De Dios's Esophagus & Dysphagia       Fabio Zhang is a  72 y.o. male here for a follow up visit for GERD.    HPI  72-year-old male presents today for follow-up visit for GERD.  He is a patient of Dr. Carias.  He was last seen in the office by me on 2/25/2022.  He has a history of GERD/De Dios's esophagus not dysplasia/esophageal stenosis and admits he is done really well on omeprazole 40 mg daily.  He denies any breakthrough reflux at this time.  He tells me peanut butter is always his swallowing test.  He tells me currently he is able to eat peanut butter without difficulty.  His last EGD was in July 2019.  His last colonoscopy was in November 2019.  He does have a history of colon polyps.  He reports his bowels are moving well currently.  He denies any dysphagia, reflux, abdominal pain, nausea and vomiting, diarrhea, constipation, rectal bleeding or melena.  He admits his appetite is good and his weight is stable.  He denies any significant GI family history at this time.  Past Medical History:   Diagnosis Date   • GERD (gastroesophageal reflux disease)    • Hypertension        Past Surgical History:   Procedure Laterality Date   • COLONOSCOPY N/A 11/21/2019    Procedure: COLONOSCOPY TO CECUM AND TI;  Surgeon: Glenn Carias MD;  Location: [facility];  Service: Gastroenterology   • ENDOSCOPY N/A 7/13/2019    benign appearing esophageal stenosis, food in lower third of esophagus, Path De Dios's esophagus   • ESOPHAGEAL DILATATION     • HERNIA REPAIR     • UPPER GASTROINTESTINAL ENDOSCOPY  07/13/2019    De Dios's esophagus       Scheduled Meds:    Continuous Infusions:No current facility-administered medications for this visit.      PRN Meds:.    No Known Allergies    Social History     Socioeconomic History   • Marital status:    Tobacco Use   • Smoking status: Never   • Smokeless tobacco: Never   Substance and Sexual Activity   • Alcohol use:  No   • Drug use: No       Family History   Problem Relation Age of Onset   • Heart attack Father    • Diabetes Brother        Review of Systems   Constitutional: Negative for appetite change, chills, diaphoresis, fatigue, fever and unexpected weight change.   HENT: Negative for nosebleeds, postnasal drip, sore throat, trouble swallowing and voice change.    Respiratory: Negative for cough, choking, chest tightness, shortness of breath, wheezing and stridor.    Cardiovascular: Negative for chest pain, palpitations and leg swelling.   Gastrointestinal: Negative for abdominal distention, abdominal pain, anal bleeding, blood in stool, constipation, diarrhea, nausea, rectal pain and vomiting.   Endocrine: Negative for polydipsia, polyphagia and polyuria.   Musculoskeletal: Negative for gait problem.   Skin: Negative for rash and wound.   Allergic/Immunologic: Negative for food allergies.   Neurological: Negative for dizziness, speech difficulty and light-headedness.   Psychiatric/Behavioral: Negative for confusion, self-injury, sleep disturbance and suicidal ideas.       Vitals:    03/06/23 1103   BP: 144/89   Pulse: 88   Temp: 96.8 °F (36 °C)   SpO2: 96%       Physical Exam  Constitutional:       General: He is not in acute distress.     Appearance: He is well-developed. He is not ill-appearing.   HENT:      Head: Normocephalic.   Eyes:      Pupils: Pupils are equal, round, and reactive to light.   Cardiovascular:      Rate and Rhythm: Normal rate and regular rhythm.      Heart sounds: Normal heart sounds.   Pulmonary:      Effort: Pulmonary effort is normal.      Breath sounds: Normal breath sounds.   Abdominal:      General: Bowel sounds are normal. There is no distension.      Palpations: Abdomen is soft. There is no mass.      Tenderness: There is no abdominal tenderness. There is no guarding or rebound.      Hernia: No hernia is present.   Musculoskeletal:         General: Normal range of motion.   Skin:      General: Skin is warm and dry.   Neurological:      Mental Status: He is alert and oriented to person, place, and time.   Psychiatric:         Speech: Speech normal.         Behavior: Behavior normal.         Judgment: Judgment normal.         No radiology results for the last 7 days     Diagnoses and all orders for this visit:    1. History of De Dios's esophagus (Primary)    2. Gastroesophageal reflux disease without esophagitis  -     omeprazole (priLOSEC) 40 MG capsule; Take 1 capsule by mouth Daily.  Dispense: 90 capsule; Refill: 3    3. Esophageal stenosis    4. History of colon polyps    GERD seems well controlled on omeprazole 40 mg daily.  No longer having any swallowing issues currently.  We did discuss for more than 20 minutes possibly lowering his dose to 20 mg daily of the omeprazole but given his history of De Dios's esophagus and severe esophageal stenosis patient would like to stay on 40 mg once a day for now.  I am in agreement with this.  Continue GERD precautions.  Bowels moving well currently.  Recall colonoscopy and EGD will be due next year.  Patient to call the office with any issues.  Patient to follow-up in 1 year.  Patient is agreeable to the plan.

## 2023-11-14 ENCOUNTER — TELEPHONE (OUTPATIENT)
Dept: GASTROENTEROLOGY | Facility: CLINIC | Age: 73
End: 2023-11-14
Payer: MEDICARE

## 2023-11-14 NOTE — TELEPHONE ENCOUNTER
Left vmail to see if he could come in later in the day if not we will need to reschedule     Ok for hub to read and reschedule

## 2024-02-14 DIAGNOSIS — K21.9 GASTROESOPHAGEAL REFLUX DISEASE WITHOUT ESOPHAGITIS: ICD-10-CM

## 2024-02-14 RX ORDER — OMEPRAZOLE 40 MG/1
40 CAPSULE, DELAYED RELEASE ORAL DAILY
Qty: 90 CAPSULE | Refills: 0 | Status: SHIPPED | OUTPATIENT
Start: 2024-02-14

## 2024-03-06 ENCOUNTER — OFFICE VISIT (OUTPATIENT)
Dept: GASTROENTEROLOGY | Facility: CLINIC | Age: 74
End: 2024-03-06
Payer: MEDICARE

## 2024-03-06 VITALS
BODY MASS INDEX: 30.42 KG/M2 | HEART RATE: 84 BPM | OXYGEN SATURATION: 94 % | DIASTOLIC BLOOD PRESSURE: 89 MMHG | TEMPERATURE: 97.7 F | SYSTOLIC BLOOD PRESSURE: 145 MMHG | HEIGHT: 70 IN | WEIGHT: 212.5 LBS

## 2024-03-06 DIAGNOSIS — K21.9 GASTROESOPHAGEAL REFLUX DISEASE WITHOUT ESOPHAGITIS: Primary | ICD-10-CM

## 2024-03-06 DIAGNOSIS — Z87.19 HISTORY OF BARRETT'S ESOPHAGUS: ICD-10-CM

## 2024-03-06 DIAGNOSIS — Z86.010 HISTORY OF COLON POLYPS: ICD-10-CM

## 2024-03-06 PROBLEM — Z86.0100 HISTORY OF COLON POLYPS: Status: ACTIVE | Noted: 2024-03-06

## 2024-03-06 NOTE — PROGRESS NOTES
"Chief Complaint  History of De Dios's esophagus    Subjective          History of Present Illness    Fabio Zhang is a  73 y.o. male presents for follow-up visit for GERD/De Dios's esophagus. He is a patient of Dr. Carias last seen by Shannon NEWTON 3/6/23.     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  This works well for him.  He has no complaints today.  Denies nausea, vomiting, melena hematochezia, abdominal pain, dysphagia or abnormal weight loss.    His last EGD was in July 2019. His last colonoscopy was in November 2019.  He does have a history of colon polyps.     Objective   Vital Signs:   /89   Pulse 84   Temp 97.7 °F (36.5 °C)   Ht 177.8 cm (70\")   Wt 96.4 kg (212 lb 8 oz)   SpO2 94%   BMI 30.49 kg/m²       Physical Exam  Vitals reviewed.   Constitutional:       General: He is awake. He is not in acute distress.     Appearance: Normal appearance. He is well-developed and well-groomed.   HENT:      Head: Normocephalic.   Pulmonary:      Effort: Pulmonary effort is normal. No respiratory distress.   Skin:     Coloration: Skin is not pale.   Neurological:      Mental Status: He is alert and oriented to person, place, and time.      Gait: Gait is intact.   Psychiatric:         Mood and Affect: Mood and affect normal.         Speech: Speech normal.         Behavior: Behavior is cooperative.         Judgment: Judgment normal.          Result Review :             Assessment and Plan    Diagnoses and all orders for this visit:    1. Gastroesophageal reflux disease without esophagitis (Primary)    2. History of De Dios's esophagus    3. History of colon polyps    Continue PPI for history of De Dios's esophagus without dysplasia.  He is due for EGD and colonoscopy for polyp and De Dios's esophagus surveillance.  He agrees to schedule this.    Follow Up   Return for Colonoscopy, EGD.    Dragon dictation used throughout this note.     Laurita Srivastava, " CARLEY

## 2024-04-19 ENCOUNTER — TELEPHONE (OUTPATIENT)
Dept: GASTROENTEROLOGY | Facility: CLINIC | Age: 74
End: 2024-04-19
Payer: MEDICARE

## 2024-04-26 ENCOUNTER — ANESTHESIA (OUTPATIENT)
Dept: GASTROENTEROLOGY | Facility: HOSPITAL | Age: 74
End: 2024-04-26
Payer: MEDICARE

## 2024-04-26 ENCOUNTER — ANESTHESIA EVENT (OUTPATIENT)
Dept: GASTROENTEROLOGY | Facility: HOSPITAL | Age: 74
End: 2024-04-26
Payer: MEDICARE

## 2024-04-26 ENCOUNTER — HOSPITAL ENCOUNTER (OUTPATIENT)
Facility: HOSPITAL | Age: 74
Setting detail: HOSPITAL OUTPATIENT SURGERY
Discharge: HOME | End: 2024-04-26
Attending: INTERNAL MEDICINE | Admitting: INTERNAL MEDICINE
Payer: MEDICARE

## 2024-04-26 VITALS
OXYGEN SATURATION: 95 % | SYSTOLIC BLOOD PRESSURE: 128 MMHG | HEIGHT: 70 IN | RESPIRATION RATE: 16 BRPM | DIASTOLIC BLOOD PRESSURE: 93 MMHG | WEIGHT: 209.5 LBS | HEART RATE: 70 BPM | BODY MASS INDEX: 29.99 KG/M2

## 2024-04-26 DIAGNOSIS — K21.9 GASTROESOPHAGEAL REFLUX DISEASE WITHOUT ESOPHAGITIS: ICD-10-CM

## 2024-04-26 DIAGNOSIS — Z86.010 HISTORY OF COLON POLYPS: ICD-10-CM

## 2024-04-26 DIAGNOSIS — Z87.19 HISTORY OF BARRETT'S ESOPHAGUS: ICD-10-CM

## 2024-04-26 PROCEDURE — G0105 COLORECTAL SCRN; HI RISK IND: HCPCS | Performed by: INTERNAL MEDICINE

## 2024-04-26 PROCEDURE — 88305 TISSUE EXAM BY PATHOLOGIST: CPT | Performed by: INTERNAL MEDICINE

## 2024-04-26 PROCEDURE — 25010000002 PROPOFOL 1000 MG/100ML EMULSION

## 2024-04-26 PROCEDURE — 25810000003 LACTATED RINGERS PER 1000 ML: Performed by: INTERNAL MEDICINE

## 2024-04-26 PROCEDURE — 43239 EGD BIOPSY SINGLE/MULTIPLE: CPT | Performed by: INTERNAL MEDICINE

## 2024-04-26 RX ORDER — PROPOFOL 10 MG/ML
INJECTION, EMULSION INTRAVENOUS AS NEEDED
Status: DISCONTINUED | OUTPATIENT
Start: 2024-04-26 | End: 2024-04-26 | Stop reason: SURG

## 2024-04-26 RX ORDER — LIDOCAINE HYDROCHLORIDE 20 MG/ML
INJECTION, SOLUTION INFILTRATION; PERINEURAL AS NEEDED
Status: DISCONTINUED | OUTPATIENT
Start: 2024-04-26 | End: 2024-04-26 | Stop reason: SURG

## 2024-04-26 RX ORDER — SODIUM CHLORIDE, SODIUM LACTATE, POTASSIUM CHLORIDE, CALCIUM CHLORIDE 600; 310; 30; 20 MG/100ML; MG/100ML; MG/100ML; MG/100ML
30 INJECTION, SOLUTION INTRAVENOUS CONTINUOUS PRN
Status: DISCONTINUED | OUTPATIENT
Start: 2024-04-26 | End: 2024-04-26 | Stop reason: HOSPADM

## 2024-04-26 RX ADMIN — PROPOFOL INJECTABLE EMULSION 160 MCG/KG/MIN: 10 INJECTION, EMULSION INTRAVENOUS at 13:12

## 2024-04-26 RX ADMIN — PROPOFOL INJECTABLE EMULSION 100 MG: 10 INJECTION, EMULSION INTRAVENOUS at 13:11

## 2024-04-26 RX ADMIN — SODIUM CHLORIDE, POTASSIUM CHLORIDE, SODIUM LACTATE AND CALCIUM CHLORIDE 30 ML/HR: 600; 310; 30; 20 INJECTION, SOLUTION INTRAVENOUS at 11:57

## 2024-04-26 RX ADMIN — LIDOCAINE HYDROCHLORIDE 60 MG: 20 INJECTION, SOLUTION INFILTRATION; PERINEURAL at 13:11

## 2024-04-26 NOTE — ANESTHESIA POSTPROCEDURE EVALUATION
"Patient: Fabio Zhang    Procedure Summary       Date: 04/26/24 Room / Location:  [facility]    Anesthesia Start: 1304 Anesthesia Stop: 1343    Procedures:       COLONOSCOPY TO CECUM/TI      ESOPHAGOGASTRODUODENOSCOPY WITH BIOPSY (Esophagus) Diagnosis:       Gastroesophageal reflux disease without esophagitis      History of De Dios's esophagus      History of colon polyps      (Gastroesophageal reflux disease without esophagitis [K21.9])      (History of De Dios's esophagus [Z87.19])      (History of colon polyps [Z86.010])    Surgeons: Glenn Carias MD Provider: Urmila Zamora MD    Anesthesia Type: MAC ASA Status: 2            Anesthesia Type: MAC    Vitals  Vitals Value Taken Time   /93 04/26/24 1405   Temp     Pulse 76 04/26/24 1412   Resp 16 04/26/24 1405   SpO2 97 % 04/26/24 1412   Vitals shown include unfiled device data.        Post Anesthesia Care and Evaluation    Patient location during evaluation: bedside  Patient participation: complete - patient participated  Level of consciousness: sleepy but conscious  Pain score: 0  Pain management: adequate    Airway patency: patent  Anesthetic complications: No anesthetic complications    Cardiovascular status: acceptable  Respiratory status: acceptable  Hydration status: acceptable    Comments: /93   Pulse 70   Resp 16   Ht 177.8 cm (70\")   Wt 95 kg (209 lb 8 oz)   SpO2 95%   BMI 30.06 kg/m²       "

## 2024-04-26 NOTE — H&P
"Chief Complaint  History of De Dios's esophagus        Subjective  History of Present Illness     Fabio Zhang is a  73 y.o. male presents for follow-up visit for GERD/De Dios's esophagus. He is a patient of Dr. Carias last seen by Shannon NEWTON 3/6/23.      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  This works well for him.  He has no complaints today.  Denies nausea, vomiting, melena hematochezia, abdominal pain, dysphagia or abnormal weight loss.     His last EGD was in July 2019. His last colonoscopy was in November 2019.  He does have a history of colon polyps.            Objective  Vital Signs:   /89   Pulse 84   Temp 97.7 °F (36.5 °C)   Ht 177.8 cm (70\")   Wt 96.4 kg (212 lb 8 oz)   SpO2 94%   BMI 30.49 kg/m²        Physical Exam  Vitals reviewed.   Constitutional:       General: He is awake. He is not in acute distress.     Appearance: Normal appearance. He is well-developed and well-groomed.   HENT:      Head: Normocephalic.   Pulmonary:      Effort: Pulmonary effort is normal. No respiratory distress.   Skin:     Coloration: Skin is not pale.   Neurological:      Mental Status: He is alert and oriented to person, place, and time.      Gait: Gait is intact.   Psychiatric:         Mood and Affect: Mood and affect normal.         Speech: Speech normal.         Behavior: Behavior is cooperative.         Judgment: Judgment normal.                  Result Review  :                Assessment and Plan    Diagnoses and all orders for this visit:     1. Gastroesophageal reflux disease without esophagitis (Primary)     2. History of De Dios's esophagus     3. History of colon polyps     Continue PPI for history of De Dios's esophagus without dysplasia.  He is due for EGD and colonoscopy for polyp and De Dios's esophagus surveillance.  He agrees to schedule this.     Follow Up   Return for Colonoscopy, EGD.     4/26/24 - No change from the above H and P.    "  Glenn Carias M.D.

## 2024-04-26 NOTE — ANESTHESIA PREPROCEDURE EVALUATION
" Anesthesia Evaluation     Patient summary reviewed and Nursing notes reviewed   no history of anesthetic complications:   NPO Solid Status: > 8 hours  NPO Liquid Status: > 2 hours           Airway   Mallampati: II  No difficulty expected  Dental - normal exam     Pulmonary    (-) COPD, asthma  Cardiovascular   Exercise tolerance: good (4-7 METS)    ECG reviewed  Rhythm: regular    (+) hypertension, dysrhythmias (RBBB)  (-) past MI, angina, cardiac stents    ROS comment: Echo 2019: · Calculated right ventricular systolic pressure from tricuspid regurgitation is 31 mmHg.  · There is calcification of the aortic valve.  · Mild to moderate aortic valve stenosis is present.  · Mild dilation of the aortic root is present. Mild dilation of the sinuses of Valsalva is present.  · Right ventricular cavity is borderline dilated.  · Mild tricuspid valve regurgitation is present.  · Saline bubble study neg for PFO  · Calculated EF = 70.0%.  · There is no evidence of pericardial effusion.    Low risk stress test 2019    Neuro/Psych  (-) seizures, CVA  GI/Hepatic/Renal/Endo    (+) GERD  (-) liver disease, no renal disease    Musculoskeletal     Abdominal    Substance History - negative use     OB/GYN          Other - negative ROS                     Anesthesia Plan    ASA 2     MAC     (I have reviewed the patient's history and chart with the patient, including all pertinent laboratory results and imaging. I have explained the risks of monitored anesthesia care including but not limited to respiratory depression, possible need for airway intervention, or possible intra-op recall. I explained that airway intervention involves risk of possible dental injury.    VITALS:  /90 (BP Location: Left arm, Patient Position: Lying)   Pulse 69   Resp 11   Ht 177.8 cm (70\")   Wt 95 kg (209 lb 8 oz)   SpO2 95%   BMI 30.06 kg/m² )  intravenous induction     Anesthetic plan, risks, benefits, and alternatives have been provided, " discussed and informed consent has been obtained with: patient.  Pre-procedure education provided    CODE STATUS:

## 2024-04-29 ENCOUNTER — TELEPHONE (OUTPATIENT)
Dept: GASTROENTEROLOGY | Facility: CLINIC | Age: 74
End: 2024-04-29
Payer: MEDICARE

## 2024-04-29 LAB
LAB AP CASE REPORT: NORMAL
PATH REPORT.FINAL DX SPEC: NORMAL
PATH REPORT.GROSS SPEC: NORMAL

## 2024-04-29 NOTE — TELEPHONE ENCOUNTER
Called pt and left  for pt to call back.     Egd placed in [facility] and  for 04/26/2027.    Results sent to pcp thru [facility].

## 2024-04-29 NOTE — TELEPHONE ENCOUNTER
----- Message from Glenn Carias MD sent at 4/29/2024  1:26 PM EDT -----  04/29/24       Tell him that pathology from his recent EGD showed mild inflammation of the stomach but no evidence of Helicobacter pylori.  The distal esophageal biopsies did not show De Dios's esophagus this time.  Because he has a history of De Dios's esophagus I would recommend that he have a repeat EGD in 3 to 5 years to make sure that this area is behaving itself?  Please send a copy of this report to his PCP.  Luis hudson

## 2024-04-29 NOTE — PROGRESS NOTES
04/29/24       Tell him that pathology from his recent EGD showed mild inflammation of the stomach but no evidence of Helicobacter pylori.  The distal esophageal biopsies did not show De Dios's esophagus this time.  Because he has a history of De Dios's esophagus I would recommend that he have a repeat EGD in 3 to 5 years to make sure that this area is behaving itself?  Please send a copy of this report to his PCP.  Luis hudson

## 2024-05-14 DIAGNOSIS — K21.9 GASTROESOPHAGEAL REFLUX DISEASE WITHOUT ESOPHAGITIS: ICD-10-CM

## 2024-05-14 RX ORDER — OMEPRAZOLE 40 MG/1
40 CAPSULE, DELAYED RELEASE ORAL DAILY
Qty: 90 CAPSULE | Refills: 3 | Status: SHIPPED | OUTPATIENT
Start: 2024-05-14

## 2024-05-14 NOTE — TELEPHONE ENCOUNTER
Called pt and advised that we have escribed his omeprazole 40mg po daily #90 with 3 refills to his [facility] pharmacy. Pt verb understanding.

## 2024-05-14 NOTE — TELEPHONE ENCOUNTER
"Caller: Fabio Zhang \"Hai\"    Relationship: Self    Best call back number: [phone]    Requested Prescriptions:   Requested Prescriptions     Pending Prescriptions Disp Refills    omeprazole (priLOSEC) 40 MG capsule 90 capsule 0     Sig: Take 1 capsule by mouth Daily.        Pharmacy where request should be sent: [facility] [id number] [place], [place] - [address] - [phone]  - [phone] FX [[id number]]      Last office visit with prescribing clinician: Visit date not found   Last telemedicine visit with prescribing clinician: Visit date not found   Next office visit with prescribing clinician: Visit date not found     Additional details provided by patient: PATIENT CALLED IN AND STATED THAT HE HAS BEEN OUT OF HIS OMEPRAZOLE MEDICATION FOR 5 DAYS.     Does the patient have less than a 3 day supply:  [x] Yes  [] No    Would you like a call back once the refill request has been completed: [x] Yes [] No    If the office needs to give you a call back, can they leave a voicemail: [x] Yes [] No    John Anderson Rep   05/14/24 13:18 EDT         "

## 2025-05-05 ENCOUNTER — TELEPHONE (OUTPATIENT)
Dept: GASTROENTEROLOGY | Facility: CLINIC | Age: 75
End: 2025-05-05
Payer: MEDICARE

## 2025-05-05 DIAGNOSIS — K21.9 GASTROESOPHAGEAL REFLUX DISEASE WITHOUT ESOPHAGITIS: ICD-10-CM

## 2025-05-05 NOTE — TELEPHONE ENCOUNTER
"    Caller: Fabio Zhang \"Hai\"    Relationship to Patient: Self    Phone Number:     [phone]       Reason for Call: THE PT NEEDS A MEDICATION REFILL, BUT HE DOESN'T KNOW IF HE NEEDS TO BE SEEN FIRST. PLEASE ADVISE.         "

## 2025-05-05 NOTE — TELEPHONE ENCOUNTER
Scheduled pt to see Laurita Srivastava on 7/11 at 10:15.  He needs his medication refilled please.

## 2025-05-06 RX ORDER — OMEPRAZOLE 40 MG/1
40 CAPSULE, DELAYED RELEASE ORAL DAILY
Qty: 90 CAPSULE | Refills: 0 | Status: SHIPPED | OUTPATIENT
Start: 2025-05-06

## 2025-07-11 ENCOUNTER — OFFICE VISIT (OUTPATIENT)
Dept: GASTROENTEROLOGY | Facility: CLINIC | Age: 75
End: 2025-07-11
Payer: MEDICARE

## 2025-07-11 VITALS
HEIGHT: 70 IN | WEIGHT: 208.8 LBS | BODY MASS INDEX: 29.89 KG/M2 | HEART RATE: 79 BPM | SYSTOLIC BLOOD PRESSURE: 130 MMHG | TEMPERATURE: 96.9 F | DIASTOLIC BLOOD PRESSURE: 86 MMHG

## 2025-07-11 DIAGNOSIS — Z87.19 HISTORY OF BARRETT'S ESOPHAGUS: ICD-10-CM

## 2025-07-11 DIAGNOSIS — Z86.0100 HISTORY OF COLON POLYPS: ICD-10-CM

## 2025-07-11 DIAGNOSIS — K21.9 GASTROESOPHAGEAL REFLUX DISEASE WITHOUT ESOPHAGITIS: Primary | ICD-10-CM

## 2025-07-11 RX ORDER — OMEPRAZOLE 40 MG/1
40 CAPSULE, DELAYED RELEASE ORAL DAILY
Qty: 90 CAPSULE | Refills: 3 | Status: SHIPPED | OUTPATIENT
Start: 2025-07-11

## 2025-07-11 NOTE — PROGRESS NOTES
"Chief Complaint  ramirez's esophagus    Subjective          History of Present Illness    Fabio Zhang is a  74 y.o. male former patient of Dr. Carias last seen for EGD on 4/26/2024 with history of Ramirez's esophagus.     The patient presents for evaluation of Ramirez's esophagus.    He is currently on omeprazole for Ramirez's esophagus, which he reports is managing well. He previously attempted a lower dose of 20 mg but found it insufficient.  He has no complaints today.  Denies abdominal pain, nausea, vomiting, dysphagia, melena hematochezia, abnormal weight loss.     Results  4/6/24 colonoscopy: Diverticulosis and internal hemorrhoids.  Recall 5 years     4/26/2024 EGD showed mild gastritis, negative H. pylori.  No evidence of Ramirez's esophagus on biopsies.  He does have a history of Ramirez's esophagus.  Consider recall in 3-5 years.    Objective   Vital Signs:   /86 (BP Location: Left arm, Patient Position: Sitting, Cuff Size: Adult)   Pulse 79   Temp 96.9 °F (36.1 °C) (Temporal)   Ht 177.8 cm (70\")   Wt 94.7 kg (208 lb 12.8 oz)   BMI 29.96 kg/m²       Physical Exam  Vitals reviewed.   Constitutional:       General: He is awake. He is not in acute distress.     Appearance: Normal appearance. He is well-developed and well-groomed.   HENT:      Head: Normocephalic.   Pulmonary:      Effort: Pulmonary effort is normal. No respiratory distress.   Skin:     Coloration: Skin is not pale.   Neurological:      Mental Status: He is alert and oriented to person, place, and time.      Gait: Gait is intact.   Psychiatric:         Mood and Affect: Mood and affect normal.         Speech: Speech normal.         Behavior: Behavior is cooperative.         Judgment: Judgment normal.        Result Review :             Assessment and Plan    Diagnoses and all orders for this visit:    1. Gastroesophageal reflux disease without esophagitis (Primary)    2. History of Ramirez's esophagus    3. History of colon " polyps         De Dios's esophagus/GERD:  - Upper endoscopy (04/2024) showed no De Dios's esophagus, indicating effective treatment  - Continue omeprazole 40 mg  - Prescription refill sent to pharmacy  - Follow-up upper endoscopy in 3 to 5 years    Colon polyps:  - Recall for colonoscopy in 5 years due to history of colon polyps           Patient or patient representative verbalized consent for the use of Ambient Listening during the visit with  Laurita Srivastava PA-C for chart documentation. 7/11/2025  16:31 EDT    Laurita Srivastava PA-C

## (undated) DEVICE — TUBING, SUCTION, 1/4" X 10', STRAIGHT: Brand: MEDLINE

## (undated) DEVICE — SENSR O2 OXIMAX FNGR A/ 18IN NONSTR

## (undated) DEVICE — KT ORCA ORCAPOD DISP STRL

## (undated) DEVICE — BITEBLOCK OMNI BLOC

## (undated) DEVICE — FRCP BX RADJAW4 NDL 2.8 240CM LG OG BX40

## (undated) DEVICE — CANN NASL CO2 TRULINK W/O2 A/

## (undated) DEVICE — Device: Brand: DEFENDO AIR/WATER/SUCTION AND BIOPSY VALVE

## (undated) DEVICE — CANN O2 ETCO2 FITS ALL CONN CO2 SMPL A/ 7IN DISP LF

## (undated) DEVICE — KT VLV BIOGUARD SXN BIOP AIR/H20 CONN 4PC DISP

## (undated) DEVICE — DILATOR CONTRL RADL 12/15MM 8CM BX5

## (undated) DEVICE — ADAPT CLN BIOGUARD AIR/H2O DISP

## (undated) DEVICE — LN SMPL CO2 SHTRM SD STREAM W/M LUER

## (undated) DEVICE — CANNULA,OXY,ADULT,SUPER SOFT,W/14'TUB,UC: Brand: MEDLINE INDUSTRIES, INC.

## (undated) DEVICE — BLCK/BITE BLOX W/DENTL/RIM W/STRAP 54F

## (undated) DEVICE — THE TORRENT IRRIGATION SCOPE CONNECTOR IS USED WITH THE TORRENT IRRIGATION TUBING TO PROVIDE IRRIGATION FLUIDS SUCH AS STERILE WATER DURING GASTROINTESTINAL ENDOSCOPIC PROCEDURES WHEN USED IN CONJUNCTION WITH AN IRRIGATION PUMP (OR ELECTROSURGICAL UNIT).: Brand: TORRENT

## (undated) DEVICE — DEV INFL CRE STERIFLATE 60CC DISP